# Patient Record
Sex: FEMALE | Race: WHITE | NOT HISPANIC OR LATINO | Employment: FULL TIME | URBAN - METROPOLITAN AREA
[De-identification: names, ages, dates, MRNs, and addresses within clinical notes are randomized per-mention and may not be internally consistent; named-entity substitution may affect disease eponyms.]

---

## 2017-06-23 ENCOUNTER — RADIANT APPOINTMENT (OUTPATIENT)
Dept: MAMMOGRAPHY | Facility: CLINIC | Age: 57
End: 2017-06-23
Payer: COMMERCIAL

## 2017-06-23 DIAGNOSIS — Z12.31 VISIT FOR SCREENING MAMMOGRAM: ICD-10-CM

## 2017-06-23 PROCEDURE — G0202 SCR MAMMO BI INCL CAD: HCPCS | Mod: TC

## 2017-07-03 ENCOUNTER — OFFICE VISIT (OUTPATIENT)
Dept: FAMILY MEDICINE | Facility: CLINIC | Age: 57
End: 2017-07-03
Payer: COMMERCIAL

## 2017-07-03 VITALS
HEIGHT: 62 IN | DIASTOLIC BLOOD PRESSURE: 82 MMHG | TEMPERATURE: 98.2 F | BODY MASS INDEX: 39.01 KG/M2 | HEART RATE: 88 BPM | RESPIRATION RATE: 15 BRPM | SYSTOLIC BLOOD PRESSURE: 118 MMHG | WEIGHT: 212 LBS | OXYGEN SATURATION: 95 %

## 2017-07-03 DIAGNOSIS — E66.9 NON MORBID OBESITY, UNSPECIFIED OBESITY TYPE: ICD-10-CM

## 2017-07-03 DIAGNOSIS — Z00.00 ROUTINE GENERAL MEDICAL EXAMINATION AT A HEALTH CARE FACILITY: Primary | ICD-10-CM

## 2017-07-03 DIAGNOSIS — Z11.59 ENCOUNTER FOR HEPATITIS C SCREENING TEST FOR LOW RISK PATIENT: ICD-10-CM

## 2017-07-03 DIAGNOSIS — D12.4 BENIGN NEOPLASM OF DESCENDING COLON: ICD-10-CM

## 2017-07-03 LAB
CHOLEST SERPL-MCNC: 257 MG/DL
GLUCOSE SERPL-MCNC: 87 MG/DL (ref 70–99)
HDLC SERPL-MCNC: 52 MG/DL
LDLC SERPL CALC-MCNC: 169 MG/DL
NONHDLC SERPL-MCNC: 205 MG/DL
TRIGL SERPL-MCNC: 180 MG/DL

## 2017-07-03 PROCEDURE — 36415 COLL VENOUS BLD VENIPUNCTURE: CPT | Performed by: INTERNAL MEDICINE

## 2017-07-03 PROCEDURE — 80061 LIPID PANEL: CPT | Performed by: INTERNAL MEDICINE

## 2017-07-03 PROCEDURE — 87624 HPV HI-RISK TYP POOLED RSLT: CPT | Performed by: INTERNAL MEDICINE

## 2017-07-03 PROCEDURE — 99386 PREV VISIT NEW AGE 40-64: CPT | Performed by: INTERNAL MEDICINE

## 2017-07-03 PROCEDURE — 86803 HEPATITIS C AB TEST: CPT | Performed by: INTERNAL MEDICINE

## 2017-07-03 PROCEDURE — 82947 ASSAY GLUCOSE BLOOD QUANT: CPT | Performed by: INTERNAL MEDICINE

## 2017-07-03 PROCEDURE — G0145 SCR C/V CYTO,THINLAYER,RESCR: HCPCS | Performed by: INTERNAL MEDICINE

## 2017-07-03 RX ORDER — CETIRIZINE HYDROCHLORIDE, PSEUDOEPHEDRINE HYDROCHLORIDE 5; 120 MG/1; MG/1
1 TABLET, FILM COATED, EXTENDED RELEASE ORAL 2 TIMES DAILY PRN
COMMUNITY

## 2017-07-03 RX ORDER — ASCORBIC ACID 1000 MG
1 TABLET ORAL DAILY
COMMUNITY
End: 2020-11-12

## 2017-07-03 RX ORDER — VITAMIN E 200 UNIT
1 CAPSULE ORAL DAILY
COMMUNITY
End: 2020-11-12

## 2017-07-03 NOTE — NURSING NOTE
"No chief complaint on file.      Initial /82  Pulse 88  Temp 98.2  F (36.8  C) (Oral)  Resp 15  Ht 5' 1.5\" (1.562 m)  Wt 212 lb (96.2 kg)  SpO2 95%  BMI 39.41 kg/m2 Estimated body mass index is 39.41 kg/(m^2) as calculated from the following:    Height as of this encounter: 5' 1.5\" (1.562 m).    Weight as of this encounter: 212 lb (96.2 kg).  Medication Reconciliation: complete    "

## 2017-07-03 NOTE — LETTER
July 5, 2017      Ketty Watkins  [address] [place] [address]        Dear MsEli Watkins,    Slightly elevated cholesterol; but overal cardiac risk assessment is low.   If risk greater than 7.5%, then statin therapy should be considered.    The 10-year ASCVD risk score ([place] Tanneryrosendo TONG Jr, et al., 2013) is: 2.2%    Values used to calculate the score:      Age: 56 years      Sex: Female      Is Non- : No      Diabetic: No      Tobacco smoker: No      Systolic Blood Pressure: 118 mmHg      Is BP treated: No      HDL Cholesterol: 52 mg/dL      Total Cholesterol: 257 mg/dL    Sincerely,     Luli Griffin MD

## 2017-07-03 NOTE — LETTER
July 19, 2017    Ketty Watkins  [address] [place] [address]    Dear Ketty,  We are happy to inform you that your PAP smear result from 07/03/17 is normal.  We are now able to do a follow up test on PAP smears. The DNA test is for HPV (Human Papilloma Virus). Cervical cancer is closely linked with certain types of HPV. Your result showed no evidence of high risk HPV.  Therefore we recommend you return in 3 years for your next pap smear.  You will still need to return to the clinic every year for an annual exam and other preventive tests.  Please contact the clinic at [phone] with any questions.  Sincerely,    Luli Griffin MD/[facility]

## 2017-07-03 NOTE — MR AVS SNAPSHOT
After Visit Summary   7/3/2017    Ketty Watkins    MRN: [id number]           Patient Information     Date Of Birth          1960        Visit Information        Provider Department      7/3/2017 8:00 AM Luli Griffin MD [facility]        Today's Diagnoses     Routine general medical examination at a health care facility    -  1    Benign neoplasm of descending colon        Encounter for hepatitis C screening test for low risk patient          Care Instructions      Preventive Health Recommendations  Female Ages 50 - 64    Yearly exam: See your health care provider every year in order to  o Review health changes.   o Discuss preventive care.    o Review your medicines if your doctor has prescribed any.      Get a Pap test every three years (unless you have an abnormal result and your provider advises testing more often).    If you get Pap tests with HPV test, you only need to test every 5 years, unless you have an abnormal result.     You do not need a Pap test if your uterus was removed (hysterectomy) and you have not had cancer.    You should be tested each year for STDs (sexually transmitted diseases) if you're at risk.     Have a mammogram every 1 to 2 years.    Have a colonoscopy at age 50, or have a yearly FIT test (stool test). These exams screen for colon cancer.      Have a cholesterol test every 5 years, or more often if advised.    Have a diabetes test (fasting glucose) every three years. If you are at risk for diabetes, you should have this test more often.     If you are at risk for osteoporosis (brittle bone disease), think about having a bone density scan (DEXA).    Shots: Get a flu shot each year. Get a tetanus shot every 10 years.    Nutrition:     Eat at least 5 servings of fruits and vegetables each day.    Eat whole-grain bread, whole-wheat pasta and brown rice instead of white grains and rice.    Talk to your provider about Calcium and Vitamin D.      Lifestyle    Exercise at least 150 minutes a week (30 minutes a day, 5 days a week). This will help you control your weight and prevent disease.    Limit alcohol to one drink per day.    No smoking.     Wear sunscreen to prevent skin cancer.     See your dentist every six months for an exam and cleaning.    See your eye doctor every 1 to 2 years.            Follow-ups after your visit        Additional Services     GASTROENTEROLOGY ADULT REF PROCEDURE ONLY       Last Lab Result: Creatinine (mg/dL)       Date                     Value                 09/24/2011               0.61             ----------  Body mass index is 39.41 kg/(m^2).      Patient will be contacted to schedule procedure.     Please be aware that coverage of these services is subject to the terms and limitations of your health insurance plan.  Call member services at your health plan with any benefit or coverage questions.  Any procedures must be performed at a [place] facility OR coordinated by your clinic's referral office.    Please bring the following with you to your appointment:    (1) Any X-Rays, CTs or MRIs which have been performed.  Contact the facility where they were done to arrange for  prior to your scheduled appointment.    (2) List of current medications   (3) This referral request   (4) Any documents/labs given to you for this referral                  Who to contact     If you have questions or need follow up information about today's clinic visit or your schedule please contact [facility] directly at [phone].  Normal or non-critical lab and imaging results will be communicated to you by MyChart, letter or phone within 4 business days after the clinic has received the results. If you do not hear from us within 7 days, please contact the clinic through MyChart or phone. If you have a critical or abnormal lab result, we will notify you by phone as soon as possible.  Submit refill requests through  "[facility] or call your pharmacy and they will forward the refill request to us. Please allow 3 business days for your refill to be completed.          Additional Information About Your Visit        MyChart Information     [employer] Gl[employer]ator[employer] lets you send messages to your doctor, view your test results, renew your prescriptions, schedule appointments and more. To sign up, go to [email or web address] . Click on \"Log in\" on the left side of the screen, which will take you to the Welcome page. Then click on \"Sign up Now\" on the right side of the page.     You will be asked to enter the access code listed below, as well as some personal information. Please follow the directions to create your username and password.     Your access code is: [email or web address]  Expires: 10/1/2017  8:47 AM     Your access code will  in 90 days. If you need help or a new code, please call your [facility] clinic or [phone].        Care EveryWhere ID     This is your Care EveryWhere ID. This could be used by other organizations to access your [facility] medical records  [id number]        Your Vitals Were     Pulse Temperature Respirations Height Pulse Oximetry BMI (Body Mass Index)    88 98.2  F (36.8  C) (Oral) 15 5' 1.5\" (1.562 m) 95% 39.41 kg/m2       Blood Pressure from Last 3 Encounters:   17 118/82   14 (!) 146/95    Weight from Last 3 Encounters:   17 212 lb (96.2 kg)   14 193 lb (87.5 kg)              We Performed the Following     GASTROENTEROLOGY ADULT REF PROCEDURE ONLY     GLUCOSE     Hepatitis C Screen Reflex to HCV RNA Quant and Genotype     HPV High Risk Types DNA Cervical     LIPID REFLEX TO DIRECT LDL PANEL     Pap imaged thin layer screen with HPV - recommended age 30 - 65 years (select HPV order below)          Today's Medication Changes          These changes are accurate as of: 7/3/17  8:47 AM.  If you have any questions, ask your nurse or doctor.               These medicines have changed or have updated " prescriptions.        Dose/Directions    FISH OIL OMEGA-3 PO   This may have changed:  Another medication with the same name was removed. Continue taking this medication, and follow the directions you see here.   Changed by:  Luli Griffin MD        Dose:  1 capsule   Take 1 capsule by mouth daily   Refills:  0         Stop taking these medicines if you haven't already. Please contact your care team if you have questions.     cetirizine 10 MG tablet   Commonly known as:  zyrTEC   Stopped by:  Luli Griffin MD                    Primary Care Provider Office Phone # Fax #    Roberth OHARA Pallas, [phone]       [facility] [address] [facility] [id number]        Equal Access to Services     : Hadii sudheer pollard hadasho Soomaali, waaxda luqadaha, qaybta kaalmada adeegyada, waxmatthew dos santos hayaparna castillo . So [facility] [phone].    ATENCIÓN: Si habla español, tiene a rivero disposición servicios gratuitos de asistencia lingüística. [facility] [phone].    We comply with applicable federal civil rights laws and [place] laws. We do not discriminate on the basis of race, color, national origin, age, disability sex, sexual orientation or gender identity.            Thank you!     Thank you for choosing [facility]  for your care. Our goal is always to provide you with excellent care. Hearing back from our patients is one way we can continue to improve our services. Please take a few minutes to complete the written survey that you may receive in the mail after your visit with us. Thank you!             Your Updated Medication List - Protect others around you: Learn how to safely use, store and throw away your medicines at www.disposemymeds.org.          This list is accurate as of: 7/3/17  8:47 AM.  Always use your most recent med list.                   Brand Name Dispense Instructions for use Diagnosis    cetirizine-psuedoePHEDrine  5-120 MG per 12 hr tablet    zyrTEC-D     Take 1 tablet by mouth 2 times daily as needed for allergies        FISH OIL OMEGA-3 PO      Take 1 capsule by mouth daily        Ginkgo Biloba 40 MG Tabs      Take 1 tablet by mouth daily        MEGARED OMEGA-3 KRILL  MG Caps      Take 1 capsule by mouth daily        Multi-vitamin Tabs tablet      Take 1 tablet by mouth daily        TURMERIC PO      Take 1 tablet by mouth daily

## 2017-07-03 NOTE — PROGRESS NOTES
SUBJECTIVE:   CC: Ketty Watkins is an 56 year old woman who presents for preventive health visit.     Healthy Habits:  Answers for HPI/ROS submitted by the patient on 7/3/2017   Annual Exam:  Getting at least 3 servings of Calcium per day:: Yes  Bi-annual eye exam:: NO  Dental care twice a year:: Yes  Sleep apnea or symptoms of sleep apnea:: Sleep apnea  Diet:: Regular (no restrictions)  Frequency of exercise:: 2-3 days/week  Taking medications regularly:: Yes  Medication side effects:: None  Additional concerns today:: No  PHQ-2 Score: 0  Duration of exercise:: 15-30 minutes    Right Foot Pain  Pt presents with pain on the bottom of her right foot.  She notices it when it is hot outside or humid, and her ankles swell up.  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  She tries to get up and move around every once in a while but often notices herself sitting for hours at a time.     Today's PHQ-2 Score:   PHQ-2 ( 1999 Pfizer) 7/3/2017   Q1: Little interest or pleasure in doing things 0   Q2: Feeling down, depressed or hopeless 0   PHQ-2 Score 0   Q1: Little interest or pleasure in doing things Not at all   Q2: Feeling down, depressed or hopeless Not at all   PHQ-2 Score 0       Abuse: Current or Past(Physical, Sexual or Emotional)- No  Do you feel safe in your environment - Yes    Social History   Substance Use Topics     Smoking status: Never Smoker     Smokeless tobacco: Never Used     Alcohol use Yes      Comment: rarely     The patient does not drink >3 drinks per day nor >7 drinks per week.    Reviewed orders with patient.  Reviewed health maintenance and updated orders accordingly - Yes  Labs reviewed in [facility]    Patient over age 50, mutual decision to screen reflected in health maintenance.    Pertinent mammograms are reviewed under the imaging tab.  History of abnormal Pap smear: NO - age 30- 65 PAP every 3 years recommended  Last 3 Pap Results: No results found for: PAP    Reviewed and updated  "as needed this visit by clinical staff  Tobacco  Allergies  Meds  Problems  Med Hx  Surg Hx  Fam Hx  Soc Hx        Reviewed and updated as needed this visit by Provider  Problems        REVIEW OF SYSTEMS:  C: NEGATIVE for fever, chills, or change in weight  E/M: NEGATIVE for ear, nose, mouth, or throat problems  R: NEGATIVE for cough or shortness of breath  CV: NEGATIVE for chest pain, palpitations or peripheral edema  GI: Positive for some acid reflux occaisionally when forward bending with exercise; colon polyp 12 mm noted on screenign colonosocpy done 11/2014- Dr. Fisher. NEGATIVE for nausea, abdominal pain, or change in bowel habits  : no urinary symptoms  GYN: 2 adult children; menopausal since 2013; no hot flashes.  HEME/Lymph: no hx of anemia; seasonal allergies  Endocrine: no hx of gestational diabetes.  M: Positive for right foot pain; NEGATIVE for significant arthralgias or myalgia  P: NEGATIVE for changes in mood or affect    This document serves as a record of the services and decisions personally performed and made by Luli Griffin MD. It was created on her behalf by Donny Lo, a trained medical scribe. The creation of this document is based the provider's statements to the medical scribe.  Donny Lo, July 3, 2017 8:20    OBJECTIVE:   /82  Pulse 88  Temp 98.2  F (36.8  C) (Oral)  Resp 15  Ht 1.562 m (5' 1.5\")  Wt 96.2 kg (212 lb)  SpO2 95%  BMI 39.41 kg/m2    EXAM:  GENERAL: Patient appears healthy, alert and not distressed.  HENT: Ear canals and TM's appear normal and mouth without ulcers or lesions, oropharynx is clear and oral mucous membranes are moist  NECK: No adenopathy present, no asymmetry, masses, or scars noted, thyroid is normal to palpation  RESP: Lungs are clear to auscultation - no rales, rhonchi or wheezes present  CV: Regular rate and rhythm, normal S1 S2 heart sounds, no ectopy, no peripheral edema present, peripheral pulses normal, no carotid " bruit.  ABDOMEN: Soft, nontender, no hepatosplenomegaly, no masses, normal bowel sounds  BREAST: Normal without masses, tenderness or nipple discharge and no palpable axillary masses or adenopathy   (female): PAP obtained today; Normal female external genitalia, vaginal mucosal atrophy noted and normal cervix, adnexae, and uterus without masses or abnormal discharge  MS: No gross musculoskeletal defects noted, no edema, gait is age appropriate without ataxia, normal heel/toe walk, negative straight leg raise  NEURO: Patient exhibits normal strength and tone, normal speech and mentation, DTRs intact bilaterally, negative Romberg's test  PSYCH: Mentation appears normal, affect is normal/bright    Diagnostic Test Results:  No results found for this or any previous visit (from the past 24 hour(s)).     ASSESSMENT/PLAN:   (Z00.00) Routine general medical examination at a health care facility  (primary encounter diagnosis)  Comment: Pt was due for an annual physical.  Due for PAP and was establishing care.  Plan: GLUCOSE, LIPID REFLEX TO DIRECT LDL PANEL, Pap         imaged thin layer screen with HPV - recommended        age 30 - 65 years (select HPV order below), HPV        High Risk Types DNA Cervical          (D12.4) Benign neoplasm of descending colon  Comment: 2014 12 mm polyp resected at hepatic flexure. follow up in 3 years.  Plan: GASTROENTEROLOGY ADULT REF PROCEDURE ONLY, to be completed this year          (Z11.59) Encounter for hepatitis C screening test for low risk patient  Comment: The patient was born between 1943 and 1963, placing them at an increased risk for developing Hepatitis C. They have been informed of their increased risk, and will therefore follow through with screening today.   Plan: Hepatitis C Screen Reflex to HCV RNA Quant and         Genotype            COUNSELING:   Reviewed preventive health counseling, as reflected in patient instructions  Special attention given to:        Regular  "exercise       Healthy diet/nutrition     reports that she has never smoked. She has never used smokeless tobacco.    Estimated body mass index is 39.41 kg/(m^2) as calculated from the following:    Height as of this encounter: 1.562 m (5' 1.5\").    Weight as of this encounter: 96.2 kg (212 lb).   Weight management plan: Encouraged to exercise more frequently with a goal of 150 minutes of exercise per week, and to maintain a healthy, well-balanced diet.    Counseling Resources:  ATP IV Guidelines  Pooled Cohorts Equation Calculator  Breast Cancer Risk Calculator  FRAX Risk Assessment  ICSI Preventive Guidelines  Dietary Guidelines for Americans, 2010  USDA's MyPlate  ASA Prophylaxis  Lung CA Screening    The information in this document, created by a medical scribe for me, accurately reflects the services I personally performed and the decisions made by me. I have reviewed and approved this document for accuracy.  Dr. Luli Griffin, July 3, 2017, 8:53 AM    Luli Griffin MD  Internal Medicine  [facility]  "

## 2017-07-05 LAB — HCV AB SERPL QL IA: NORMAL

## 2017-07-07 LAB
COPATH REPORT: NORMAL
PAP: NORMAL

## 2017-07-10 LAB
FINAL DIAGNOSIS: NORMAL
HPV HR 12 DNA CVX QL NAA+PROBE: NEGATIVE
HPV16 DNA SPEC QL NAA+PROBE: NEGATIVE
HPV18 DNA SPEC QL NAA+PROBE: NEGATIVE
SPECIMEN DESCRIPTION: NORMAL

## 2017-10-19 ENCOUNTER — TRANSFERRED RECORDS (OUTPATIENT)
Dept: HEALTH INFORMATION MANAGEMENT | Facility: CLINIC | Age: 57
End: 2017-10-19

## 2017-11-28 ENCOUNTER — HOSPITAL ENCOUNTER (OUTPATIENT)
Facility: CLINIC | Age: 57
Discharge: HOME | End: 2017-11-28
Attending: COLON & RECTAL SURGERY | Admitting: COLON & RECTAL SURGERY
Payer: COMMERCIAL

## 2017-11-28 VITALS
RESPIRATION RATE: 14 BRPM | BODY MASS INDEX: 39.01 KG/M2 | OXYGEN SATURATION: 97 % | DIASTOLIC BLOOD PRESSURE: 85 MMHG | HEART RATE: 85 BPM | WEIGHT: 212 LBS | HEIGHT: 62 IN | SYSTOLIC BLOOD PRESSURE: 136 MMHG

## 2017-11-28 LAB — COLONOSCOPY: NORMAL

## 2017-11-28 PROCEDURE — 99153 MOD SED SAME PHYS/QHP EA: CPT | Performed by: COLON & RECTAL SURGERY

## 2017-11-28 PROCEDURE — 45378 DIAGNOSTIC COLONOSCOPY: CPT | Performed by: COLON & RECTAL SURGERY

## 2017-11-28 PROCEDURE — 25000128 H RX IP 250 OP 636: Performed by: COLON & RECTAL SURGERY

## 2017-11-28 PROCEDURE — G0500 MOD SEDAT ENDO SERVICE >5YRS: HCPCS | Performed by: COLON & RECTAL SURGERY

## 2017-11-28 PROCEDURE — G0121 COLON CA SCRN NOT HI RSK IND: HCPCS | Performed by: COLON & RECTAL SURGERY

## 2017-11-28 RX ORDER — LIDOCAINE 40 MG/G
CREAM TOPICAL
Status: DISCONTINUED | OUTPATIENT
Start: 2017-11-28 | End: 2017-11-28 | Stop reason: HOSPADM

## 2017-11-28 RX ORDER — FLUMAZENIL 0.1 MG/ML
0.2 INJECTION, SOLUTION INTRAVENOUS
Status: DISCONTINUED | OUTPATIENT
Start: 2017-11-28 | End: 2017-11-28 | Stop reason: HOSPADM

## 2017-11-28 RX ORDER — NALOXONE HYDROCHLORIDE 0.4 MG/ML
.1-.4 INJECTION, SOLUTION INTRAMUSCULAR; INTRAVENOUS; SUBCUTANEOUS
Status: DISCONTINUED | OUTPATIENT
Start: 2017-11-28 | End: 2017-11-28 | Stop reason: HOSPADM

## 2017-11-28 RX ORDER — ONDANSETRON 2 MG/ML
4 INJECTION INTRAMUSCULAR; INTRAVENOUS EVERY 6 HOURS PRN
Status: DISCONTINUED | OUTPATIENT
Start: 2017-11-28 | End: 2017-11-28 | Stop reason: HOSPADM

## 2017-11-28 RX ORDER — FENTANYL CITRATE 50 UG/ML
INJECTION, SOLUTION INTRAMUSCULAR; INTRAVENOUS PRN
Status: DISCONTINUED | OUTPATIENT
Start: 2017-11-28 | End: 2017-11-28 | Stop reason: HOSPADM

## 2017-11-28 RX ORDER — ONDANSETRON 4 MG/1
4 TABLET, ORALLY DISINTEGRATING ORAL EVERY 6 HOURS PRN
Status: DISCONTINUED | OUTPATIENT
Start: 2017-11-28 | End: 2017-11-28 | Stop reason: HOSPADM

## 2017-11-28 RX ORDER — ONDANSETRON 2 MG/ML
4 INJECTION INTRAMUSCULAR; INTRAVENOUS
Status: DISCONTINUED | OUTPATIENT
Start: 2017-11-28 | End: 2017-11-28 | Stop reason: HOSPADM

## 2017-11-28 RX ORDER — ONDANSETRON 2 MG/ML
INJECTION INTRAMUSCULAR; INTRAVENOUS PRN
Status: DISCONTINUED | OUTPATIENT
Start: 2017-11-28 | End: 2017-11-28 | Stop reason: HOSPADM

## 2017-11-28 NOTE — IP AVS SNAPSHOT
[facility]    [address]    [facility] [address]    Phone:  [phone]    Fax:  [phone]                                       After Visit Summary   11/28/2017    Ketty Watkins    MRN: [id number]           After Visit Summary Signature Page     I have received my discharge instructions, and my questions have been answered. I have discussed any challenges I see with this plan with the nurse or doctor.    ..........................................................................................................................................  Patient/Patient Representative Signature      ..........................................................................................................................................  Patient Representative Print Name and Relationship to Patient    ..................................................               ................................................  Date                                            Time    ..........................................................................................................................................  Reviewed by Signature/Title    ...................................................              ..............................................  Date                                                            Time

## 2017-11-28 NOTE — DISCHARGE INSTRUCTIONS
Eating a High-Fiber Diet  Fiber is what gives strength and structure to plants. Most grains, beans, vegetables, and fruits contain fiber. Foods rich in fiber are often low in calories and fat, and they fill you up more. They may also reduce your risks for certain health problems. To find out the amount of fiber in canned, packaged, or frozen foods, read the  Nutrition Facts  label. It tells you how much fiber is in a serving.      Types of Fiber and Their Benefits  There are two types of fiber: insoluble and soluble. They both aid digestion and help you maintain a healthy weight.  Insoluble fiber: This is found in whole grains, cereals, certain fruits and vegetables (such as apple skin, corn, and carrots). Insoluble fiber may prevent constipation and reduce the risk of certain types of cancer.   Soluble fiber: This type of fiber is in oats, beans, and certain fruits and vegetables (such as strawberries and peas). Soluble fiber can reduce cholesterol (which may help lower the risk of heart disease), and helps control blood sugar levels.  Look for High-Fiber Foods  Whole-grain breads and cereals: Try to eat 6-8 ounces a day. Include wheat and oat bran cereals, whole-wheat muffins or toast, and corn tortillas in your meals.  Fruits: Try to eat 2 cups a day. Apples, oranges, strawberries, pears, and bananas are good sources. (Note: Fruit juice is low in fiber.)  Vegetables: Try to eat 3 cups a day. Add asparagus, carrots, broccoli, peas, and corn to your meals.  Legumes (beans): One cup of cooked lentils gives you over 15 grams of fiber. Try navy beans, lentils, and chickpeas.  Seeds:  A small handful of seeds gives you about 3 grams of fiber. Try sunflower seeds.    Keep Track of Your Fiber  A healthy diet includes 31 grams of fiber a day if you have a 2,000-calorie diet. Keep track of how much fiber you eat. Start by reading food labels. Then eat a variety of foods high in fiber. Ask your doctor about supplemental  fiber products.            6002-6251 [facility], [address] [place] [address], [place] [address]. All rights reserved. This information is not intended as a substitute for professional medical care. Always follow your healthcare professional's instructions.    Understanding Diverticulosis and Diverticulitis     Pouches or diverticula usually occur in the lower part of the colon called the sigmoid.      Diverticulitis occurs when the pouches become inflamed.     The colon (large intestine) is the last part of the digestive tract. It absorbs water from stool and changes it from a liquid to a solid. In certain cases, small pouches called diverticula can form in the colon wall. This condition is called diverticulosis. The pouches can become infected. If this happens, it becomes a more serious problem called diverticulitis. These problems can be painful. But they can be managed.   Managing Your Condition  Diet changes or taking medications are often tried first. These may be enough to bring relief. If the case is bad, surgery may be done. You and your doctor can discuss the plan that is best for you.  If You Have Diverticulosis  Diet changes are often enough to control symptoms. The main changes are adding fiber (roughage) and drinking more water. Fiber absorbs water as it travels through your colon. This helps your stool stay soft and move smoothly. Water helps this process. If needed, you may be told to take over-the-counter stool softeners. To help relieve pain, antispasmodic medications may be prescribed.  If You Have Diverticulitis  Treatment depends on how bad your symptoms are.  For mild symptoms: You may be put on a liquid diet for a short time. You may also be prescribed antibiotics. If these two steps relieve your symptoms, you may then be prescribed a high-fiber diet. If you still have symptoms, your doctor will discuss further treatment options with you.  For severe symptoms: You may need to be admitted to the  hospital. There, you can be given IV antibiotics and fluids. Once symptoms are under control, the above treatments may be tried. If these don t control your condition, your doctor may discuss the option of having surgery with you.  [place] to Colon Health  Help keep your colon healthy with a diet that includes plenty of high-fiber fruits, vegetables, and whole grains. Drink plenty of liquids like water and juice. Your doctor may also recommend avoiding seeds and nuts.          3057-0035 Georgina \[facility] [place] [facility]\"", [address] [place] [address], [place] [address]. All rights reserved. This information is not intended as a substitute for professional medical care. Always follow your healthcare professional's instructions.

## 2017-11-28 NOTE — OP NOTE
.See Provation Note In Chart    Michelle Fisher MD  [facility]  Office Phone # [phone]

## 2017-11-28 NOTE — H&P
Pre-Endoscopy History and Physical     Ketty Watkins MRN# [id number]   YOB: 1960 Age: 57 year old     Date of Procedure: 11/28/2017  Primary care provider: Luli Griffin  Type of Endoscopy: colonoscopy  Reason for Procedure: surveillance  Type of Anesthesia Anticipated: Moderate Sedation    HPI:    Ketty is a 57 year old female who will be undergoing the above procedure.      A history and physical has been performed. The patient's medications and allergies have been reviewed. The risks and benefits of the procedure and the sedation options and risks were discussed with the patient.  All questions were answered and informed consent was obtained.      She denies a personal or family history of anesthesia complications or bleeding disorders.     Allergies   Allergen Reactions     Codeine Itching        Prior to Admission Medications   Prescriptions Last Dose Informant Patient Reported? Taking?   Ginkgo Biloba 40 MG TABS Past Week at Unknown time  Yes Yes   Sig: Take 1 tablet by mouth daily   MEGARED OMEGA-3 KRILL  MG CAPS Past Week at Unknown time  Yes Yes   Sig: Take 1 capsule by mouth daily   Omega-3 Fatty Acids (FISH OIL OMEGA-3 PO) Past Week at Unknown time  Yes Yes   Sig: Take 1 capsule by mouth daily   TURMERIC PO Past Week at Unknown time  Yes Yes   Sig: Take 1 tablet by mouth daily   cetirizine-psuedoePHEDrine (ZYRTEC-D) 5-120 MG per 12 hr tablet Past Week at Unknown time  Yes Yes   Sig: Take 1 tablet by mouth 2 times daily as needed for allergies   multivitamin, therapeutic with minerals (MULTI-VITAMIN) TABS Past Week at Unknown time  Yes Yes   Sig: Take 1 tablet by mouth daily      Facility-Administered Medications: None       Patient Active Problem List   Diagnosis     Benign neoplasm of descending colon     Non morbid obesity, unspecified obesity type        Past Medical History:   Diagnosis Date     Colon polyps         Past Surgical History:   Procedure Laterality Date      "COLONOSCOPY         Social History   Substance Use Topics     Smoking status: Never Smoker     Smokeless tobacco: Never Used     Alcohol use Yes      Comment: rarely       Family History   Problem Relation Age of Onset     Coronary Artery Disease Mother      Hyperlipidemia Mother      Coronary Artery Disease Father      Hyperlipidemia Father      Cancer - colorectal No family hx of        REVIEW OF SYSTEMS:     5 point ROS negative except as noted above in HPI, including Gen., Resp., CV, GI &  system review.      PHYSICAL EXAM:   BP (!) 164/108  Pulse 85  Resp 16  Ht 1.568 m (5' 1.75\")  Wt 96.2 kg (212 lb)  SpO2 98%  BMI 39.09 kg/m2 Estimated body mass index is 39.09 kg/(m^2) as calculated from the following:    Height as of this encounter: 1.568 m (5' 1.75\").    Weight as of this encounter: 96.2 kg (212 lb).   GENERAL APPEARANCE: healthy and alert  MENTAL STATUS: alert  AIRWAY EXAM: Mallampatti Class II (visualization of the soft palate, fauces, and uvula)  RESP: lungs clear to auscultation - no rales, rhonchi or wheezes  CV: regular rates and rhythm      DIAGNOSTICS:    Not indicated      IMPRESSION   ASA Class 2 - Mild systemic disease        PLAN:       Plan for colonoscopy. We discussed the risks, benefits and alternatives and the patient wished to proceed.    The above has been forwarded to the consulting provider.      Signed Electronically by: Michelle Fisher MD  November 28, 2017    "

## 2017-11-28 NOTE — OR NURSING
Pt discharged home pt said she feels slight nausea when start moving ,pt encouraged to stay longer in recovery pt refused and almanza id I want to go home and rest ,both pt and  said pt wants to leave

## 2017-11-28 NOTE — IP AVS SNAPSHOT
MRN:[id number]                      After Visit Summary   11/28/2017    Ketty Watkins    MRN: [id number]           Thank you!     Thank you for choosing [facility] for your care. Our goal is always to provide you with excellent care. Hearing back from our patients is one way we can continue to improve our services. Please take a few minutes to complete the written survey that you may receive in the mail after you visit. If you would like to speak to someone directly about your visit please contact Patient Relations at [phone]. Thank you!          Patient Information     Date Of Birth          1960        About your hospital stay     You were admitted on:  November 28, 2017 You last received care in the:  [facility]    You were discharged on:  November 28, 2017       Who to Call     For medical emergencies, please call 911.  For non-urgent questions about your medical care, please call your primary care provider or clinic, [phone]  For questions related to your surgery, please call your surgery clinic        Attending Provider     Provider Specialty    Michelle Fisher MD Colon and Rectal Surgery       Primary Care Provider Office Phone # Fax #    Luli Griffin [phone]      Further instructions from your care team         Eating a High-Fiber Diet  Fiber is what gives strength and structure to plants. Most grains, beans, vegetables, and fruits contain fiber. Foods rich in fiber are often low in calories and fat, and they fill you up more. They may also reduce your risks for certain health problems. To find out the amount of fiber in canned, packaged, or frozen foods, read the  Nutrition Facts  label. It tells you how much fiber is in a serving.      Types of Fiber and Their Benefits  There are two types of fiber: insoluble and soluble. They both aid digestion and help you maintain a healthy weight.  Insoluble fiber: This is  found in whole grains, cereals, certain fruits and vegetables (such as apple skin, corn, and carrots). Insoluble fiber may prevent constipation and reduce the risk of certain types of cancer.   Soluble fiber: This type of fiber is in oats, beans, and certain fruits and vegetables (such as strawberries and peas). Soluble fiber can reduce cholesterol (which may help lower the risk of heart disease), and helps control blood sugar levels.  Look for High-Fiber Foods  Whole-grain breads and cereals: Try to eat 6-8 ounces a day. Include wheat and oat bran cereals, whole-wheat muffins or toast, and corn tortillas in your meals.  Fruits: Try to eat 2 cups a day. Apples, oranges, strawberries, pears, and bananas are good sources. (Note: Fruit juice is low in fiber.)  Vegetables: Try to eat 3 cups a day. Add asparagus, carrots, broccoli, peas, and corn to your meals.  Legumes (beans): One cup of cooked lentils gives you over 15 grams of fiber. Try navy beans, lentils, and chickpeas.  Seeds:  A small handful of seeds gives you about 3 grams of fiber. Try sunflower seeds.    Keep Track of Your Fiber  A healthy diet includes 31 grams of fiber a day if you have a 2,000-calorie diet. Keep track of how much fiber you eat. Start by reading food labels. Then eat a variety of foods high in fiber. Ask your doctor about supplemental fiber products.            7606-2531 [employer] [facility], [address] [place] [address], [place] [address]. All rights reserved. This information is not intended as a substitute for professional medical care. Always follow your healthcare professional's instructions.    Understanding Diverticulosis and Diverticulitis     Pouches or diverticula usually occur in the lower part of the colon called the sigmoid.      Diverticulitis occurs when the pouches become inflamed.     The colon (large intestine) is the last part of the digestive tract. It absorbs water from stool and changes it from a liquid to a solid. In certain  cases, small pouches called diverticula can form in the colon wall. This condition is called diverticulosis. The pouches can become infected. If this happens, it becomes a more serious problem called diverticulitis. These problems can be painful. But they can be managed.   Managing Your Condition  Diet changes or taking medications are often tried first. These may be enough to bring relief. If the case is bad, surgery may be done. You and your doctor can discuss the plan that is best for you.  If You Have Diverticulosis  Diet changes are often enough to control symptoms. The main changes are adding fiber (roughage) and drinking more water. Fiber absorbs water as it travels through your colon. This helps your stool stay soft and move smoothly. Water helps this process. If needed, you may be told to take over-the-counter stool softeners. To help relieve pain, antispasmodic medications may be prescribed.  If You Have Diverticulitis  Treatment depends on how bad your symptoms are.  For mild symptoms: You may be put on a liquid diet for a short time. You may also be prescribed antibiotics. If these two steps relieve your symptoms, you may then be prescribed a high-fiber diet. If you still have symptoms, your doctor will discuss further treatment options with you.  For severe symptoms: You may need to be admitted to the hospital. There, you can be given IV antibiotics and fluids. Once symptoms are under control, the above treatments may be tried. If these don t control your condition, your doctor may discuss the option of having surgery with you.  [place] [facility] Colon [facility]  Help keep your colon healthy with a diet that includes plenty of high-fiber fruits, vegetables, and whole grains. Drink plenty of liquids like water and juice. Your doctor may also recommend avoiding seeds and nuts.          [id number] [facility], [address] [place] [address], [place] [address]. All rights reserved. This information is not intended as a  "substitute for professional medical care. Always follow your healthcare professional's instructions.    Pending Results     No orders found from 11/26/2017 to 11/29/2017.            Admission Information     Date & Time Provider Department Dept. Phone    11/28/2017 Michelle Fisher MD [facility] [phone]      Your Vitals Were     Blood Pressure Pulse Respirations Height Weight Pulse Oximetry    155/112 85 14 1.568 m (5' 1.75\") 96.2 kg (212 lb) 94%    BMI (Body Mass Index)                   39.09 kg/m2           MyChart Information     [employer] gives you secure access to your electronic health record. If you see a primary care provider, you can also send messages to your care team and make appointments. If you have questions, please call your primary care clinic.  If you do not have a primary care provider, please call [phone] and they will assist you.        Care EveryWhere ID     This is your Care EveryWhere ID. This could be used by other organizations to access your [facility] medical records  [id number]        Equal Access to Services     EDWIN MARTINEZ : Hadii sudheer pollard hadasho Soomaali, waaxda luqadaha, qaybta kaalmada adeegyakenn, john horan. So [facility] [phone].    ATENCIÓN: Si habla español, tiene a rviero disposición servicios gratuitos de asistencia lingüística. Llame al [phone].    We comply with applicable federal civil rights laws and [place] laws. We do not discriminate on the basis of race, color, national origin, age, disability, sex, sexual orientation, or gender identity.               Review of your medicines      CONTINUE these medicines which have NOT CHANGED        Dose / Directions    cetirizine-psuedoePHEDrine 5-120 MG per 12 hr tablet   Commonly known as:  zyrTEC-D        Dose:  1 tablet   Take 1 tablet by mouth 2 times daily as needed for allergies   Refills:  0       FISH OIL OMEGA-3 PO        Dose:  1 capsule   Take 1 capsule by " mouth daily   Refills:  0       Ginkgo Biloba 40 MG Tabs        Dose:  1 tablet   Take 1 tablet by mouth daily   Refills:  0       MEGARED OMEGA-3 KRILL  MG Caps        Dose:  1 capsule   Take 1 capsule by mouth daily   Refills:  0       Multi-vitamin Tabs tablet        Dose:  1 tablet   Take 1 tablet by mouth daily   Refills:  0       TURMERIC PO        Dose:  1 tablet   Take 1 tablet by mouth daily   Refills:  0                Protect others around you: Learn how to safely use, store and throw away your medicines at www.disposemymeds.org.             Medication List: This is a list of all your medications and when to take them. Check marks below indicate your daily home schedule. Keep this list as a reference.      Medications           Morning Afternoon Evening Bedtime As Needed    cetirizine-psuedoePHEDrine 5-120 MG per 12 hr tablet   Commonly known as:  zyrTEC-D   Take 1 tablet by mouth 2 times daily as needed for allergies                                FISH OIL OMEGA-3 PO   Take 1 capsule by mouth daily                                Ginkgo Biloba 40 MG Tabs   Take 1 tablet by mouth daily                                MEGARED OMEGA-3 KRILL  MG Caps   Take 1 capsule by mouth daily                                Multi-vitamin Tabs tablet   Take 1 tablet by mouth daily                                TURMERIC PO   Take 1 tablet by mouth daily

## 2019-06-16 ENCOUNTER — APPOINTMENT (OUTPATIENT)
Dept: ULTRASOUND IMAGING | Facility: CLINIC | Age: 59
End: 2019-06-16
Attending: EMERGENCY MEDICINE
Payer: COMMERCIAL

## 2019-06-16 ENCOUNTER — HOSPITAL ENCOUNTER (EMERGENCY)
Facility: CLINIC | Age: 59
Discharge: HOME | End: 2019-06-16
Attending: EMERGENCY MEDICINE | Admitting: EMERGENCY MEDICINE
Payer: COMMERCIAL

## 2019-06-16 VITALS
DIASTOLIC BLOOD PRESSURE: 78 MMHG | HEART RATE: 80 BPM | TEMPERATURE: 96.9 F | RESPIRATION RATE: 20 BRPM | SYSTOLIC BLOOD PRESSURE: 129 MMHG | OXYGEN SATURATION: 100 %

## 2019-06-16 DIAGNOSIS — K80.20 CALCULUS OF GALLBLADDER WITHOUT CHOLECYSTITIS WITHOUT OBSTRUCTION: ICD-10-CM

## 2019-06-16 DIAGNOSIS — R10.11 RIGHT UPPER QUADRANT ABDOMINAL PAIN: ICD-10-CM

## 2019-06-16 LAB
ALBUMIN SERPL-MCNC: 3.8 G/DL (ref 3.4–5)
ALBUMIN UR-MCNC: NEGATIVE MG/DL
ALP SERPL-CCNC: 137 U/L (ref 40–150)
ALT SERPL W P-5'-P-CCNC: 27 U/L (ref 0–50)
ANION GAP SERPL CALCULATED.3IONS-SCNC: 11 MMOL/L (ref 3–14)
APPEARANCE UR: ABNORMAL
AST SERPL W P-5'-P-CCNC: 23 U/L (ref 0–45)
BACTERIA #/AREA URNS HPF: ABNORMAL /HPF
BASOPHILS # BLD AUTO: 0 10E9/L (ref 0–0.2)
BASOPHILS NFR BLD AUTO: 0.3 %
BILIRUB SERPL-MCNC: 0.4 MG/DL (ref 0.2–1.3)
BILIRUB UR QL STRIP: NEGATIVE
BUN SERPL-MCNC: 9 MG/DL (ref 7–30)
CALCIUM SERPL-MCNC: 8.6 MG/DL (ref 8.5–10.1)
CHLORIDE SERPL-SCNC: 104 MMOL/L (ref 94–109)
CO2 SERPL-SCNC: 22 MMOL/L (ref 20–32)
COLOR UR AUTO: ABNORMAL
CREAT SERPL-MCNC: 0.49 MG/DL (ref 0.52–1.04)
DIFFERENTIAL METHOD BLD: ABNORMAL
EOSINOPHIL # BLD AUTO: 0 10E9/L (ref 0–0.7)
EOSINOPHIL NFR BLD AUTO: 0.4 %
ERYTHROCYTE [DISTWIDTH] IN BLOOD BY AUTOMATED COUNT: 12 % (ref 10–15)
GFR SERPL CREATININE-BSD FRML MDRD: >90 ML/MIN/{1.73_M2}
GLUCOSE SERPL-MCNC: 139 MG/DL (ref 70–99)
GLUCOSE UR STRIP-MCNC: NEGATIVE MG/DL
HCT VFR BLD AUTO: 48 % (ref 35–47)
HGB BLD-MCNC: 15.8 G/DL (ref 11.7–15.7)
HGB UR QL STRIP: NEGATIVE
IMM GRANULOCYTES # BLD: 0 10E9/L (ref 0–0.4)
IMM GRANULOCYTES NFR BLD: 0.4 %
KETONES UR STRIP-MCNC: ABNORMAL MG/DL
LEUKOCYTE ESTERASE UR QL STRIP: ABNORMAL
LIPASE SERPL-CCNC: 65 U/L (ref 73–393)
LYMPHOCYTES # BLD AUTO: 1.3 10E9/L (ref 0.8–5.3)
LYMPHOCYTES NFR BLD AUTO: 18.2 %
MCH RBC QN AUTO: 30 PG (ref 26.5–33)
MCHC RBC AUTO-ENTMCNC: 32.9 G/DL (ref 31.5–36.5)
MCV RBC AUTO: 91 FL (ref 78–100)
MONOCYTES # BLD AUTO: 0.2 10E9/L (ref 0–1.3)
MONOCYTES NFR BLD AUTO: 3.1 %
MUCOUS THREADS #/AREA URNS LPF: PRESENT /LPF
NEUTROPHILS # BLD AUTO: 5.5 10E9/L (ref 1.6–8.3)
NEUTROPHILS NFR BLD AUTO: 77.6 %
NITRATE UR QL: NEGATIVE
NRBC # BLD AUTO: 0 10*3/UL
NRBC BLD AUTO-RTO: 0 /100
PH UR STRIP: 7.5 PH (ref 5–7)
PLATELET # BLD AUTO: 275 10E9/L (ref 150–450)
POTASSIUM SERPL-SCNC: 3.5 MMOL/L (ref 3.4–5.3)
PROT SERPL-MCNC: 7.7 G/DL (ref 6.8–8.8)
RBC # BLD AUTO: 5.26 10E12/L (ref 3.8–5.2)
RBC #/AREA URNS AUTO: 2 /HPF (ref 0–2)
SODIUM SERPL-SCNC: 137 MMOL/L (ref 133–144)
SOURCE: ABNORMAL
SP GR UR STRIP: 1.01 (ref 1–1.03)
SQUAMOUS #/AREA URNS AUTO: 3 /HPF (ref 0–1)
TROPONIN I SERPL-MCNC: <0.015 UG/L (ref 0–0.04)
UROBILINOGEN UR STRIP-MCNC: NORMAL MG/DL (ref 0–2)
WBC # BLD AUTO: 7 10E9/L (ref 4–11)
WBC #/AREA URNS AUTO: 11 /HPF (ref 0–5)

## 2019-06-16 PROCEDURE — 80053 COMPREHEN METABOLIC PANEL: CPT | Performed by: EMERGENCY MEDICINE

## 2019-06-16 PROCEDURE — 76705 ECHO EXAM OF ABDOMEN: CPT

## 2019-06-16 PROCEDURE — 96375 TX/PRO/DX INJ NEW DRUG ADDON: CPT

## 2019-06-16 PROCEDURE — 96361 HYDRATE IV INFUSION ADD-ON: CPT

## 2019-06-16 PROCEDURE — 87086 URINE CULTURE/COLONY COUNT: CPT | Performed by: EMERGENCY MEDICINE

## 2019-06-16 PROCEDURE — 84484 ASSAY OF TROPONIN QUANT: CPT | Performed by: EMERGENCY MEDICINE

## 2019-06-16 PROCEDURE — 25000128 H RX IP 250 OP 636: Performed by: EMERGENCY MEDICINE

## 2019-06-16 PROCEDURE — 81001 URINALYSIS AUTO W/SCOPE: CPT | Performed by: EMERGENCY MEDICINE

## 2019-06-16 PROCEDURE — 99285 EMERGENCY DEPT VISIT HI MDM: CPT | Mod: 25

## 2019-06-16 PROCEDURE — 83690 ASSAY OF LIPASE: CPT | Performed by: EMERGENCY MEDICINE

## 2019-06-16 PROCEDURE — 85025 COMPLETE CBC W/AUTO DIFF WBC: CPT | Performed by: EMERGENCY MEDICINE

## 2019-06-16 PROCEDURE — 96374 THER/PROPH/DIAG INJ IV PUSH: CPT

## 2019-06-16 PROCEDURE — 96376 TX/PRO/DX INJ SAME DRUG ADON: CPT

## 2019-06-16 RX ORDER — SODIUM CHLORIDE 9 MG/ML
1000 INJECTION, SOLUTION INTRAVENOUS CONTINUOUS
Status: DISCONTINUED | OUTPATIENT
Start: 2019-06-16 | End: 2019-06-16 | Stop reason: HOSPADM

## 2019-06-16 RX ORDER — ONDANSETRON 2 MG/ML
4 INJECTION INTRAMUSCULAR; INTRAVENOUS EVERY 30 MIN PRN
Status: DISCONTINUED | OUTPATIENT
Start: 2019-06-16 | End: 2019-06-16 | Stop reason: HOSPADM

## 2019-06-16 RX ORDER — HYDROMORPHONE HYDROCHLORIDE 1 MG/ML
0.5 INJECTION, SOLUTION INTRAMUSCULAR; INTRAVENOUS; SUBCUTANEOUS
Status: DISCONTINUED | OUTPATIENT
Start: 2019-06-16 | End: 2019-06-16 | Stop reason: HOSPADM

## 2019-06-16 RX ORDER — ONDANSETRON 4 MG/1
4 TABLET, ORALLY DISINTEGRATING ORAL EVERY 6 HOURS PRN
Qty: 10 TABLET | Refills: 0 | Status: SHIPPED | OUTPATIENT
Start: 2019-06-16 | End: 2019-06-23

## 2019-06-16 RX ADMIN — SODIUM CHLORIDE 1000 ML: 9 INJECTION, SOLUTION INTRAVENOUS at 08:52

## 2019-06-16 RX ADMIN — HYDROMORPHONE HYDROCHLORIDE 0.5 MG: 1 INJECTION, SOLUTION INTRAMUSCULAR; INTRAVENOUS; SUBCUTANEOUS at 08:53

## 2019-06-16 RX ADMIN — PROCHLORPERAZINE EDISYLATE 10 MG: 5 INJECTION INTRAMUSCULAR; INTRAVENOUS at 12:10

## 2019-06-16 RX ADMIN — ONDANSETRON 4 MG: 2 INJECTION INTRAMUSCULAR; INTRAVENOUS at 08:53

## 2019-06-16 RX ADMIN — ONDANSETRON 4 MG: 2 INJECTION INTRAMUSCULAR; INTRAVENOUS at 09:41

## 2019-06-16 NOTE — ED TRIAGE NOTES
Presents to the ED with RUQ abdominal pain that began at 0130 this morning. Reports a history of gallbladder problems several years ago, states this feels similar. Also nauseated and diaphoretic.

## 2019-06-16 NOTE — ED AVS SNAPSHOT
[facility] Emergency Department  [address]  [facility] [address]  Phone:  [phone]  Fax:  [phone]                                    Ketty Watkins   MRN: [id number]    Department:  [facility] Emergency Department   Date of Visit:  6/16/2019           After Visit Summary Signature Page    I have received my discharge instructions, and my questions have been answered. I have discussed any challenges I see with this plan with the nurse or doctor.    ..........................................................................................................................................  Patient/Patient Representative Signature      ..........................................................................................................................................  Patient Representative Print Name and Relationship to Patient    ..................................................               ................................................  Date                                   Time    ..........................................................................................................................................  Reviewed by Signature/Title    ...................................................              ..............................................  Date                                               Time          22EPIC Rev 08/18

## 2019-06-16 NOTE — ED PROVIDER NOTES
History     Chief Complaint:  Abdominal Pain    HPI   Ketty Watkins is a 58 year old female with a history of gall bladder attack in 2012 who presents with abdominal pain. The patient states that she woke up to the pain at 0130 and states the pain was burning and in her right upper quadrant. The patient reports nausea and vomitting some bile up. He patient states that she took tylenol at 0130 and was able to fall back asleep until 0530 when she woke back up feeling dizzy and clammy. She states she last ate pizza last night. She denies any shortness of breath, chest pain, dysuria or diarrhea. The patient states this pain is similar to her previous gall bladder attack, but is not as sharp as before. She thinks this could be due to the fact that she has taken tylenol.     Allergies:  codeine     Medications:    Gingko biloba    Past Medical History:    Colon polyps    Past Surgical History:    Colonoscopy X2    Family History:    CAD- mother, father  Hyperlipidemia - mother, father    Social History:  The patient was accompanied to the ED by .  Smoking Status: Never Smoker  Smokeless Tobacco: Never Used  Alcohol Use: Positive  Marital Status:       Review of Systems   All other systems reviewed and are negative.      Physical Exam      Patient Vitals for the past 24 hrs:   BP Temp Temp src Pulse Resp SpO2   06/16/19 1245 (!) 162/94 -- -- 82 -- 100 %   06/16/19 1230 145/82 -- -- 88 -- --   06/16/19 1215 158/79 -- -- 82 -- 92 %   06/16/19 1200 (!) 165/96 -- -- 87 -- 93 %   06/16/19 1145 142/85 -- -- 84 -- 93 %   06/16/19 1130 163/90 -- -- 78 -- 94 %   06/16/19 1115 151/84 -- -- 80 -- 93 %   06/16/19 1100 152/85 -- -- 80 -- 92 %   06/16/19 1045 (!) 165/93 -- -- 77 -- 92 %   06/16/19 1030 166/88 -- -- 81 -- 90 %   06/16/19 1015 (!) 166/95 -- -- 81 -- 90 %   06/16/19 1000 (!) 158/101 -- -- 78 -- 90 %   06/16/19 0950 (!) 161/92 -- -- -- -- 95 %   06/16/19 0945 -- -- -- 81 -- --   06/16/19 0940 (!) 166/95 --  -- -- -- --   06/16/19 0900 (!) 155/91 -- -- 78 -- 91 %   06/16/19 0758 (!) 189/123 96.9  F (36.1  C) Temporal 91 20 95 %       Physical Exam  General: Resting on the bed.  Head: No obvious trauma to head.  Ears, Nose, Throat:  External ears normal.  Nose normal.    Eyes:  Conjunctivae clear.  Pupils are equal, round, and reactive.   Neck: Normal range of motion.  Neck supple.   CV: Regular rate and rhythm.  No murmurs.      Respiratory: Effort normal and breath sounds normal.  No wheezing or crackles.   Gastrointestinal: Soft.  No distension. There is mild RUQ and epigastric tenderness.  There is no rigidity, no rebound and no guarding.   Neuro: Alert. Moving all extremities appropriately.  Normal speech.    Skin: Skin is warm and dry.  No rash noted.     Emergency Department Course   ECG:  ECG taken at 0829, ECG read at 0840  Normal sinus rhythm  Possible left atrial enlargement   Left ventricular hypertrophy  abnormal ECG  Rate 87 bpm. AR interval 176 ms. QRS duration 86 ms. QT/QTc 388/466 ms. P-R-T axes 33 11 7.  No significant change when compared to EKG dated 9/24/11.    Imaging:  Radiology findings were communicated with the patient who voiced understanding of the findings.    US limited:  Cholelithiasis. No change.  Reading per radiology    Laboratory:  Laboratory findings were communicated with the patient who voiced understanding of the findings.      CBC: WBC 7.0, HGB 15.8(H),   CMP: Glucose 139 (H)  o/w WNL (Creatinine 0.49(L))  Troponin (Collected 0849): <0.015  Lipase: 65 (L)    UA with micro: [place] trace (A) pH urine 7.5 (H) Leukocyte esterase small (A) WBC/HPF 11 (H) bacteria few (A) squamous epithelial 3 (H) mucus present (A)  o/w negative    Interventions:  0852 NS, 1 L, IV  0853 Zofran 4 mg IV  0853 Dilaudid 0.5 mg IV  0941 Zofran 4 mg IV  1210 compazine 10 mg IV  Emergency Department Course:     Nursing notes and vitals reviewed.    0815 I performed an exam of the patient as documented  above.     0849 IV was inserted and blood was drawn for laboratory testing, results above.    0849 The patient provided a urine sample here in the emergency department. This was sent for laboratory testing, findings above.    0904 The patient was sent for a US abdomen  while in the emergency department, results above.     1008 I returned to check on patient.     1300 I personally reviewed the laboratory and imaging results with the patient  and answered all related questions prior to discharge.    Impression & Plan       Medical Decision Makin-year-old female with a history of prior biliary colic presents with abdominal pain.  Vital signs are unremarkable.  Broad differential was pursued including but not limited to cholecystitis, cholelithiasis, cholangitis, biliary colic, electrolyte metabolic renal dysfunction, hepatitis, pancreatitis, acute coronary syndrome, etc.  Patient denies any chest pain shortness breath.  EKG is relatively unremarkable without any acute ST-T wave changes.  No evidence of ischemia.  Troponin is negative.  Overall low suspicion for acute coronary syndrome.  Patient has reproducible epigastric tenderness on exam.  CBC shows no leukocytosis and mildly elevated hemoglobin effect.  BMP shows no acute electrolyte metabolic or renal dysfunction.  LFTs and lipase are reassuring not concerning for obstructive biliary process, hepatitis, pancreatitis.  UA shows small leuk esterase 11 white blood cells and few bacteria but no other concerning signs of infection.  Urine culture added on.  Patient denies any symptoms to suggest UTI.  Imaging confirms cholelithiasis.  No evidence of cholecystitis or cholangitis etc.  Patient has no focal right upper quadrant tenderness on reassessment.  Do not suspect cholecystitis or cholangitis etc.  Patient is otherwise well-appearing nontoxic.  She became quite dizzy after the Dilaudid.  Attempted to manage her nausea with fluids and Zofran.  She was also  given a dose of Compazine.  At this point think the more likely medication induced that rather than from the cholelithiasis.  She felt well enough to return.  Patient was discharged with close general surgery follow-up recommended.  Strict return precautions discussed.  Patient was discharged in stable improved condition.    Diagnosis:      ICD-10-CM    1. Right upper quadrant abdominal pain R10.11    2. Calculus of gallbladder without cholecystitis without obstruction K80.20         Disposition:   The patient is discharged to home.    Discharge Medications:START taking      Dose / Directions   ondansetron 4 MG ODT tab  Commonly known as:  ZOFRAN ODT      Dose:  4 mg  Take 1 tablet (4 mg) by mouth every 6 hours as needed for nausea  Quantity:  10 tablet  Refills:  0       Scribe Disclosure:  Lala AYALA, am serving as a scribe at 8:06 AM on 6/16/2019 to document services personally performed by Shima Schwab MD, based on my observations and the provider's statements to me.    [facility] EMERGENCY DEPARTMENT       Shima Schwab MD  06/16/19 0425

## 2019-06-16 NOTE — DISCHARGE INSTRUCTIONS
Return to the ED if you are unable to tolerate fluids, intractable nausea or vomiting, severe abdominal pain, fevers >101 or other acute changes.  Please follow up with your PCP in 2-3 days.      It appears that you have gallbladder stones, if you have increasing pain, fevers, intractable vomiting or other concerns return to the emergency department as this may be a sign of your gallbladder being infected.  Otherwise it is recommended that you follow-up with general surgery for possible gallbladder removal.    Discharge Instructions  Biliary Colic    You have been seen today for biliary colic. Biliary colic is the pain that happens when gallstones block the normal flow of bile from the gallbladder.  It usually is a steady or crampy pain in the upper abdomen (belly), most often under the right side of the rib cage where the gallbladder is. Sometimes you get pain from the gallbladder in your back or shoulder. It is common to have nausea (sick to stomach) and vomiting (throwing up) with biliary colic.    Bile is a liquid the body makes to help with digesting fat.  It is made by the liver and stored in the gallbladder and released from the gall bladder when you eat fatty foods. Gallstones can form for a variety of reasons. Risk factors for gallstones include being female, having a family history of gallstones, being older, being pregnant or having been pregnant, having diabetes, having rapid weight loss, and others.      Once gallstones form, surgeons usually tell you to have your gallbladder removed. There is medicine that can dissolve gallstones, but it can be unpleasant to take, and gallstones tend to come back when you quit taking the medicine. Your regular provider can help decide on the right treatment for you, and may refer you to a surgeon to discuss whether surgery is right in your case.     Complications of gallstones include infection, jaundice, inflammation of the pancreas, and rupture of the gallbladder.  One of these complications will happen to about one out of every four patients with gallstones over the next 10-20 years if they are not treated.       Generally, every Emergency Department visit should have a follow-up clinic visit with either a primary or a specialty clinic/provider. Please follow-up as instructed by your emergency provider today.    Return to the Emergency Department if you develop:  Fever greater than 100.5 F.   Persistent nausea and vomiting.  Pain that will not go away with the medicines you were given here.  Yellow skin or eye color (jaundice).  Other new concerning symptoms.    What can I do to help myself?  Eat regular meals at least three times a day, to make the gallbladder empty before it gets too full.  Avoid fried or fatty foods.  Drink plenty of clear fluids.  Take over-the-counter or prescribed pain medications as recommended by your provider.      If you were given a prescription for medicine here today, be sure to read all of the information (including the package insert) that comes with your prescription.  This will include important information about the medicine, its side effects, and any warnings that you need to know about.  The pharmacist who fills the prescription can provide more information and answer questions you may have about the medicine.  If you have questions or concerns that the pharmacist cannot address, please call or return to the Emergency Department.     Remember that you can always come back to the Emergency Department if you are not able to see your regular provider in the amount of time listed above, if you get any new symptoms, or if there is anything that worries you.

## 2019-06-17 LAB
BACTERIA SPEC CULT: NO GROWTH
INTERPRETATION ECG - MUSE: NORMAL
Lab: NORMAL
SPECIMEN SOURCE: NORMAL

## 2019-06-25 ENCOUNTER — OFFICE VISIT (OUTPATIENT)
Dept: FAMILY MEDICINE | Facility: CLINIC | Age: 59
End: 2019-06-25
Payer: COMMERCIAL

## 2019-06-25 VITALS
DIASTOLIC BLOOD PRESSURE: 98 MMHG | OXYGEN SATURATION: 96 % | TEMPERATURE: 99.3 F | RESPIRATION RATE: 16 BRPM | HEIGHT: 61 IN | WEIGHT: 212.3 LBS | BODY MASS INDEX: 40.08 KG/M2 | HEART RATE: 93 BPM | SYSTOLIC BLOOD PRESSURE: 146 MMHG

## 2019-06-25 DIAGNOSIS — E66.01 MORBID OBESITY (H): ICD-10-CM

## 2019-06-25 DIAGNOSIS — R73.09 ELEVATED GLUCOSE: ICD-10-CM

## 2019-06-25 DIAGNOSIS — K80.20 CALCULUS OF GALLBLADDER WITHOUT CHOLECYSTITIS WITHOUT OBSTRUCTION: Primary | ICD-10-CM

## 2019-06-25 DIAGNOSIS — J30.2 SEASONAL ALLERGIC RHINITIS, UNSPECIFIED TRIGGER: ICD-10-CM

## 2019-06-25 DIAGNOSIS — R03.0 ELEVATED BP WITHOUT DIAGNOSIS OF HYPERTENSION: ICD-10-CM

## 2019-06-25 LAB — HBA1C MFR BLD: 5.6 % (ref 0–5.6)

## 2019-06-25 PROCEDURE — 36415 COLL VENOUS BLD VENIPUNCTURE: CPT | Performed by: INTERNAL MEDICINE

## 2019-06-25 PROCEDURE — 83036 HEMOGLOBIN GLYCOSYLATED A1C: CPT | Performed by: INTERNAL MEDICINE

## 2019-06-25 PROCEDURE — 99214 OFFICE O/P EST MOD 30 MIN: CPT | Performed by: INTERNAL MEDICINE

## 2019-06-25 ASSESSMENT — MIFFLIN-ST. JEOR: SCORE: 1490.37

## 2019-06-25 NOTE — PATIENT INSTRUCTIONS
Patient Education     Eating Heart-Healthy Food: Using the DASH Plan    Eating for your heart doesn t have to be hard or boring. You just need to know how to make healthier choices. The DASH eating plan has been developed to help you do just that. DASH stands for Dietary Approaches to Stop Hypertension. It is a plan that has been proven to be healthier for your heart and to lower your risk for high blood pressure. It can also help lower your risk for cancer, heart disease, osteoporosis, and diabetes.  Choosing from each food group  Choose foods from each of the food groups below each day. Try to get the recommended number of servings for each food group. The serving numbers are based on a diet of 2,000 calories a day. Talk to your doctor if you re unsure about your calorie needs. Along with getting the correct servings, the DASH plan also recommends a sodium intake less than 2,300 mg per day.        Grains  Servings: 6 to 8 a day  A serving is:    1 slice bread    1 ounce dry cereal    Half a cup cooked rice, pasta or cereal  Best choices: Whole grains and any grains high in fiber. Vegetables  Servings: 4 to 5 a day  A serving is:    1 cup raw leafy vegetable    Half a cup cut-up raw or cooked vegetable    Half a cup vegetable juice  Best choices: Fresh or frozen vegetables prepared without added salt or fat.   Fruits  Servings: 4 to 5 a day  A serving is:    1 medium fruit    One-quarter cup dried fruit    Half a cup fresh, frozen, or canned fruit    Half a cup of 100% fruit juices  Best choices: A variety of fresh fruits of different colors. Whole fruits are a better choice than fruit juices. Low-fat or fat-free dairy  Servings: 2 to 3 a day  A serving is:    1 cup milk    1 cup yogurt    One and a half ounces cheese  Best choices: Skim or 1% milk, low-fat or fat-free yogurt or buttermilk, and low-fat cheeses.         Lean meats, poultry, fish  Servings: 6 or fewer a day  A serving is:    1 ounce cooked meats,  poultry, or fish    1 egg  Best choices: Lean poultry and fish. Trim away visible fat. Broil, grill, roast, or boil instead of frying. Remove skin from poultry before eating. Limit how much red meat you eat.  Nuts, seeds, beans  Servings: 4 to 5 a week  A serving is:    One-third cup nuts (one and a half ounces)    2 tablespoons nut butter or seeds    Half a cup cooked dry beans or legumes  Best choices: Dry roasted nuts with no salt added, lentils, kidney beans, garbanzo beans, and whole hamilton beans.   Fats and oils  Servings: 2 to 3 a day  A serving is:    1 teaspoon vegetable oil    1 teaspoon soft margarine    1 tablespoon mayonnaise    2 tablespoons salad dressing  Best choices: Nut and vegetable oils (nontropical vegetable oils), such as olive and canola oil. Sweets  Servings: 5 a week or fewer  A serving is:    1 tablespoon sugar, maple syrup, or honey    1 tablespoon jam or jelly    1 half-ounce jelly beans (about 15)    1 cup lemonade  Best choices: Dried fruit can be a satisfying sweet. Choose low-fat sweets. And watch your serving sizes!      For more on the DASH eating plan, visit:  www.nhlbi.nih.gov/health/health-topics/topics/dash   Date Last Reviewed: 6/1/2016 2000-2018 [employer]. [address] [place] [address], [place] [address]. All rights reserved. This information is not intended as a substitute for professional medical care. Always follow your healthcare professional's instructions.

## 2019-06-27 ENCOUNTER — TELEPHONE (OUTPATIENT)
Dept: FAMILY MEDICINE | Facility: CLINIC | Age: 59
End: 2019-06-27

## 2019-06-27 NOTE — TELEPHONE ENCOUNTER
Referral received from CHRISTOPHER WHARTON  for gallbladder.    Attempt #1:    Called patient at [phone].  No answer - left message for patient to return call to clinic at [phone].  Chio

## 2019-07-02 NOTE — TELEPHONE ENCOUNTER
Attempt #2:    Called patient at [phone] .  No answer - left message for patient to return call to clinic at [phone].  Chio

## 2019-07-15 NOTE — TELEPHONE ENCOUNTER
Attempt #3:    Called patient at [phone].  No answer - left message for patient to return call to clinic at [phone].  hCio Flynn return call received from patient - closing encounter.

## 2019-08-25 NOTE — PROGRESS NOTES
Subjective     Last OV with PCP 7/3/2017    Ketty Watkins is a 58 year old female who presents to clinic today for the following health issues:    HPI   ED/UC Followup:    Facility:  [facility]  Date of visit: 6/16/2019  Reason for visit: Abdominal pain  Current Status: Patient states that she is doing better since hospital visit      Known gall stone present.  Episode of cholelithiasis 2011 and 2019  Was hoping to avoid surgery; would consider in Fall, electively;       Patient Active Problem List   Diagnosis     Benign neoplasm of descending colon     Non morbid obesity, unspecified obesity type     Morbid obesity (H)     Past Surgical History:   Procedure Laterality Date     COLONOSCOPY       COLONOSCOPY Left 11/28/2017    Procedure: COLONOSCOPY;  Colonoscopy ;  Surgeon: Michelle Fisher MD;  Location:  GI       Social History     Tobacco Use     Smoking status: Never Smoker     Smokeless tobacco: Never Used   Substance Use Topics     Alcohol use: Yes     Comment: rarely     Family History   Problem Relation Age of Onset     Coronary Artery Disease Mother      Hyperlipidemia Mother      Coronary Artery Disease Father      Hyperlipidemia Father      Cancer - colorectal No family hx of          Current Outpatient Medications   Medication Sig Dispense Refill     cetirizine-psuedoePHEDrine (ZYRTEC-D) 5-120 MG per 12 hr tablet Take 1 tablet by mouth 2 times daily as needed for allergies       MEGARED OMEGA-3 KRILL  MG CAPS Take 1 capsule by mouth daily       multivitamin, therapeutic with minerals (MULTI-VITAMIN) TABS Take 1 tablet by mouth daily       Omega-3 Fatty Acids (FISH OIL OMEGA-3 PO) Take 1 capsule by mouth daily       Ginkgo Biloba 40 MG TABS Take 1 tablet by mouth daily       TURMERIC PO Take 1 tablet by mouth daily       Allergies   Allergen Reactions     Codeine Itching     Recent Labs   Lab Test 06/25/19  1025 06/16/19  0849 07/03/17  0853 09/24/11  0505   A1C 5.6  --   --   --    LDL  --   --  " 169*  --    HDL  --   --  52  --    TRIG  --   --  180*  --    ALT  --  27  --  26   CR  --  0.49*  --  0.61   GFRESTIMATED  --  >90  --  >90   GFRESTBLACK  --  >90  --  >90   POTASSIUM  --  3.5  --  3.8      BP Readings from Last 3 Encounters:   06/25/19 (!) 146/98   06/16/19 129/78   11/28/17 136/85    Wt Readings from Last 3 Encounters:   06/25/19 96.3 kg (212 lb 4.8 oz)   11/28/17 96.2 kg (212 lb)   07/03/17 96.2 kg (212 lb)           Reviewed and updated as needed this visit by Provider  Tobacco  Allergies  Meds  Problems  Med Hx  Surg Hx  Fam Hx         Review of Systems   ROS COMP: CONSTITUTIONAL: NEGATIVE for fever, chills, change in weight  EYES: NEGATIVE for vision changes or irritation  ENT/MOUTH: NEGATIVE for ear, mouth and throat problems  RESP: NEGATIVE for significant cough or SOB  CV: BLOOD PRESSURE running high today; not high when in ER;   BP Readings from Last 3 Encounters:   06/25/19 (!) 146/98   06/16/19 129/78   11/28/17 136/85     NEGATIVE for chest pain, palpitations or peripheral edema  GI: ER Records reviewed regarding RUQ pain and known gall bladder stone. Normal LFTs  Lab Results   Component Value Date    ALT 27 06/16/2019    ALT 26 09/24/2011     Lab Results   Component Value Date    AST 23 06/16/2019    AST 25 09/24/2011        MUSCULOSKELETAL: NEGATIVE for significant arthralgias or myalgia  NEURO: NEGATIVE for weakness, dizziness or paresthesias  ENDOCRINE: NEGATIVE for temperature intolerance, skin/hair changes  HEME/ALLERGY/IMMUNE: NEGATIVE for bleeding problems  PSYCHIATRIC: NEGATIVE for changes in mood or affect      Objective    BP (!) 146/98 (BP Location: Right arm, Patient Position: Chair, Cuff Size: Adult Large)   Pulse 93   Temp 99.3  F (37.4  C) (Tympanic)   Resp 16   Ht 1.549 m (5' 1\")   Wt 96.3 kg (212 lb 4.8 oz)   LMP  (LMP Unknown)   SpO2 96%   BMI 40.11 kg/m    Body mass index is 40.11 kg/m .  Physical Exam   GENERAL: healthy, alert and no " distress  NECK: no adenopathy, no asymmetry, masses, or scars and thyroid normal to palpation  RESP: lungs clear to auscultation - no rales, rhonchi or wheezes  CV: regular rate and rhythm, normal S1 S2, no S3 or S4, no murmur, click or rub, no peripheral edema and peripheral pulses strong  ABDOMEN: soft, nontender, bowel sounds normal; Gall bladder is NONTENDER and not palpable.  MS: no gross musculoskeletal defects noted, no edema  NEURO: Normal strength and tone, mentation intact and speech normal  PSYCH: mentation appears normal, affect normal/bright    Diagnostic Test Results:  Labs reviewed in Epic        Assessment & Plan     (K80.20) Calculus of gallbladder without cholecystitis without obstruction  (primary encounter diagnosis)  Comment: ER records reviewed, ultrasound noted, normal liver function tests.  Patient is content with waiting until fall to readdress possible surgical intervention.  If her symptoms were to increase he has the phone number and could schedule it sooner.  Surgical referral referral has been provided  Plan: GENERAL SURG ADULT REFERRAL          (E66.01) Morbid obesity (H)  Comment: Body mass index is 40.11 kg/m .   Plan: Encourage regular exercise, portion control and healthy diet    (R03.0) Elevated BP without diagnosis of hypertension  Comment: Previous blood pressure readings have not been elevated, including when she was in the emergency room was more of an acute exacerbation.  The elevation in her blood pressure may in fact be related to the use of Sudafed and her allergy medicine.  Discussed lifestyle treatment options.  She prefers to address with diet and exercise; provide info on DASH and reassess.  Plan: Recommend nurse visit in 2 to 4 weeks to reassess blood pressure.    (R73.09) Elevated glucose  Comment: elevated in ER, suspect it was not fasting.  no DM or GDM; check A1C   Plan: Hemoglobin A1c          (J30.2) Seasonal allergic rhinitis, unspecified trigger  Comment:  "molds, pollens.  using Zyrtec-D; encourage Zyrtec without D due to blood pressure issues.   Plan: Continue to monitor blood pressure once she is no longer using pseudoephedrine     BMI:   Estimated body mass index is 40.11 kg/m  as calculated from the following:    Height as of this encounter: 1.549 m (5' 1\").    Weight as of this encounter: 96.3 kg (212 lb 4.8 oz).   Weight management plan: Discussed healthy diet and exercise guidelines        Return in about 2 months (around 8/25/2019) for if preop needed for future  lap chano.    Luli Griffin MD  Internal Medicine   [facility]      "

## 2019-09-29 ENCOUNTER — HEALTH MAINTENANCE LETTER (OUTPATIENT)
Age: 59
End: 2019-09-29

## 2020-10-01 ENCOUNTER — OFFICE VISIT (OUTPATIENT)
Dept: URGENT CARE | Facility: URGENT CARE | Age: 60
End: 2020-10-01
Payer: COMMERCIAL

## 2020-10-01 VITALS
HEART RATE: 84 BPM | DIASTOLIC BLOOD PRESSURE: 99 MMHG | BODY MASS INDEX: 39.38 KG/M2 | TEMPERATURE: 97.9 F | OXYGEN SATURATION: 96 % | SYSTOLIC BLOOD PRESSURE: 153 MMHG | WEIGHT: 208.4 LBS

## 2020-10-01 DIAGNOSIS — N30.91 CYSTITIS WITH HEMATURIA: Primary | ICD-10-CM

## 2020-10-01 DIAGNOSIS — R82.90 NONSPECIFIC FINDING ON EXAMINATION OF URINE: ICD-10-CM

## 2020-10-01 DIAGNOSIS — R35.0 INCREASED FREQUENCY OF URINATION: ICD-10-CM

## 2020-10-01 LAB
ALBUMIN UR-MCNC: NEGATIVE MG/DL
APPEARANCE UR: CLEAR
BACTERIA #/AREA URNS HPF: ABNORMAL /HPF
BILIRUB UR QL STRIP: NEGATIVE
COLOR UR AUTO: ABNORMAL
GLUCOSE UR STRIP-MCNC: NEGATIVE MG/DL
HGB UR QL STRIP: ABNORMAL
KETONES UR STRIP-MCNC: NEGATIVE MG/DL
LEUKOCYTE ESTERASE UR QL STRIP: ABNORMAL
MUCOUS THREADS #/AREA URNS LPF: PRESENT /LPF
NITRATE UR QL: NEGATIVE
PH UR STRIP: 7 PH (ref 5–7)
RBC #/AREA URNS AUTO: ABNORMAL /HPF
SOURCE: ABNORMAL
SP GR UR STRIP: 1.01 (ref 1–1.03)
UROBILINOGEN UR STRIP-ACNC: 0.2 EU/DL (ref 0.2–1)
WBC #/AREA URNS AUTO: ABNORMAL /HPF

## 2020-10-01 PROCEDURE — 87086 URINE CULTURE/COLONY COUNT: CPT | Performed by: PHYSICIAN ASSISTANT

## 2020-10-01 PROCEDURE — 99213 OFFICE O/P EST LOW 20 MIN: CPT | Performed by: PHYSICIAN ASSISTANT

## 2020-10-01 PROCEDURE — 81001 URINALYSIS AUTO W/SCOPE: CPT | Performed by: PHYSICIAN ASSISTANT

## 2020-10-01 RX ORDER — NITROFURANTOIN 25; 75 MG/1; MG/1
100 CAPSULE ORAL 2 TIMES DAILY
Qty: 14 CAPSULE | Refills: 0 | Status: SHIPPED | OUTPATIENT
Start: 2020-10-01 | End: 2020-10-08

## 2020-10-01 NOTE — PROGRESS NOTES
Ketty Soriano to clinic today c/o 1 day hx of dysuria, urinary urgency/frequency and suprapubic area pressure. Patient reports increased sxs over night and noted some blood in urine (including a few small clots) this morning.      [facility] 1[facility]2 [facility]  No hx of kidney stones or recurrent UTI's.     Red Flag Review: Denies any abdominal pain or flank pain. Denies any F/C/N/V/D or other acute sxs.      Past Medical History:   Diagnosis Date     Colon polyps        Current Outpatient Medications   Medication     MEGARED OMEGA-3 KRILL  MG CAPS     multivitamin, therapeutic with minerals (MULTI-VITAMIN) TABS     Omega-3 Fatty Acids (FISH OIL OMEGA-3 PO)     cetirizine-psuedoePHEDrine (ZYRTEC-D) 5-120 MG per 12 hr tablet     Ginkgo Biloba 40 MG TABS     TURMERIC PO     No current facility-administered medications for this visit.        Allergies   Allergen Reactions     Codeine Itching       ROS:     CONSITUTIONAL: Denies any fever, chills or acute onset weakness  CARDIAC: Denies any CP or SOB  RESP: Neil any cough, CP or SOB    GI: Denies any abdominal pain. Denies any F/C/N/V/D.   GYN: . Menopausal since 2011. No pelvic pain. No abnormal vaginal discharge.   SKIN: Denies rash   NEURO: Denies any confusion or mental status changes      OBJECTIVE:  BP (!) 153/99   Pulse 84   Temp 97.9  F (36.6  C)   Wt 94.5 kg (208 lb 6.4 oz)   LMP  (LMP Unknown)   SpO2 96%   BMI 39.38 kg/m        GENERAL:  Very pleasant, comfortable and generally well appearing.  SKIN: No rashes.  Normal color.  Sclera clear.  CARDIAC:NORMAL - regular rate and rhythm without murmur., normal s1/s2 and without extra heart sounds  RESP: Normal - CTA without rales, rhonchi, or wheezing.  ABDOMEN:  Soft, non-tender, non-distended.  Positive normal bowel sounds.  No HSM or masses.  Positive, mild, suprapubic tenderness.  No CVA tenderness.  NEURO: Alert and oriented.  Normal speech and  mentation.  CN II/XII grossly intact.  Gait within normal limits.      Results for orders placed or performed in visit on 10/01/20   *UA reflex to Microscopic and Culture ([facility] (except [place] and [place])     Status: Abnormal    Specimen: Midstream Urine   Result Value Ref Range    Color Urine Pink     Appearance Urine Clear     Glucose Urine Negative NEG^Negative mg/dL    Bilirubin Urine Negative NEG^Negative    Ketones Urine Negative NEG^Negative mg/dL    Specific Gravity Urine 1.010 1.003 - 1.035    Blood Urine Large (A) NEG^Negative    pH Urine 7.0 5.0 - 7.0 pH    Protein Albumin Urine Negative NEG^Negative mg/dL    Urobilinogen Urine 0.2 0.2 - 1.0 EU/dL    Nitrite Urine Negative NEG^Negative    Leukocyte Esterase Urine Moderate (A) NEG^Negative    Source Midstream Urine    Urine Microscopic     Status: Abnormal   Result Value Ref Range    WBC Urine 10-25 (A) OTO5^0 - 5 /HPF    RBC Urine O - 2 OTO2^O - 2 /HPF    Bacteria Urine Moderate (A) NEG^Negative /HPF    Mucous Urine Present (A) NEG^Negative /LPF       ASSESSMENT/PLAN:    (N30.91) Cystitis with hematuria  (primary encounter diagnosis)  Plan: nitroFURantoin macrocrystal-monohydrate         (MACROBID) 100 MG capsule    October 1, 2020  Urgent Care Plan:     1. Drink extra water to flush your bladder     2. Start the antibiotics I prescribed today     3.  Take the  full course of antibiotic as prescribed.     4.  Follow-up with your primary care provider  if your symptoms do not improve after 4-6 doses  of antibiotic.     5. Follow up immediately if your symptoms worsen, if you develop fever, chills, back pain, abdominal pain, nausea, vomiting, weakness or any new symptoms.       (R35.0) Increased frequency of urination  Plan: *UA reflex to Microscopic and Culture ([place]         [facility] and         [place]), Urine Microscopic      (R82.90) Nonspecific finding on examination of urine  Plan: Urine  Culture Aerobic Bacterial

## 2020-10-01 NOTE — PATIENT INSTRUCTIONS
"  Patient Education     Bladder Infection, Female (Adult)    Urine is normally doesn't have any bacteria in it. But bacteria can get into the urinary tract from the skin around the rectum. Or they can travel in the blood from elsewhere in the body. Once they are in your urinary tract, they can cause infection in the urethra (urethritis), the bladder (cystitis), or the kidneys (pyelonephritis).  The most common place for an infection is in the bladder. This is called a bladder infection. This is one of the most common infections in women. Most bladder infections are easily treated. They are not serious unless the infection spreads to the kidney.  The phrases \"bladder infection,\" \"UTI,\" and \"cystitis\" are often used to describe the same thing. But they are not always the same. Cystitis is an inflammation of the bladder. The most common cause of cystitis is an infection.  Symptoms  The infection causes inflammation in the urethra and bladder. This causes many of the symptoms. The most common symptoms of a bladder infection are:    Pain or burning when urinating    Having to urinate more often than usual    Urgent need to urinate    Only a small amount of urine comes out    Blood in urine    Abdominal discomfort. This is usually in the lower abdomen above the pubic bone.    Cloudy urine    Strong- or bad-smelling urine    Unable to urinate (urinary retention)    Unable to hold urine in (urinary incontinence)    Fever    Loss of appetite    Confusion (in older adults)  Causes  Bladder infections are not contagious. You can't get one from someone else, from a toilet seat, or from sharing a bath.  The most common cause of bladder infections is bacteria from the bowels. The bacteria get onto the skin around the opening of the urethra. From there, they can get into the urine and travel up to the bladder, causing inflammation and infection. This usually happens because of:    Wiping improperly after urinating. Always wipe " from front to back.    Bowel incontinence    Pregnancy    Procedures such as having a catheter inserted    Older age    Not emptying your bladder. This can allow bacteria a chance to grow in your urine.    Dehydration    Constipation    Sex    Use of a diaphragm for birth control   Treatment  Bladder infections are diagnosed by a urine test. They are treated with antibiotics and usually clear up quickly without complications. Treatment helps prevent a more serious kidney infection.  Medicines  Medicines can help in the treatment of a bladder infection:    Take antibiotics until they are used up, even if you feel better. It is important to finish them to make sure the infection has cleared.    You can use acetaminophen or ibuprofen for pain, fever, or discomfort, unless another medicine was prescribed. If you have chronic liver or kidney disease, talk with your healthcare provider before using these medicines. Also talk with your provider if you've ever had a stomach ulcer or gastrointestinal bleeding, or are taking blood-thinner medicines.    If you are given phenazopydridine to reduce burning with urination, it will cause your urine to become a bright orange color. This can stain clothing.  Care and prevention  These self-care steps can help prevent future infections:    Drink plenty of fluids to prevent dehydration and flush out your bladder. Do this unless you must restrict fluids for other health reasons, or your doctor told you not to.    Proper cleaning after going to the bathroom is important. Wipe from front to back after using the toilet to prevent the spread of bacteria.    Urinate more often. Don't try to hold urine in for a long time.    Wear loose-fitting clothes and cotton underwear. Avoid tight-fitting pants.    Improve your diet and prevent constipation. Eat more fresh fruit and vegetables, and fiber, and less junk and fatty foods.    Avoid sex until your symptoms are gone.    Avoid caffeine,  alcohol, and spicy foods. These can irritate your bladder.    Urinate right after intercourse to flush out your bladder.    If you use birth control pills and have frequent bladder infections, discuss it with your doctor.  Follow-up care  Call your healthcare provider if all symptoms are not gone after 3 days of treatment. This is especially important if you have repeat infections.  If a culture was done, you will be told if your treatment needs to be changed. If directed, you can call to find out the results.  If X-rays were done, you will be told if the results will affect your treatment.  Call 911  Call 911 if any of the following occur:    Trouble breathing    Hard to wake up or confusion    Fainting or loss of consciousness    Rapid heart rate  When to seek medical advice  Call your healthcare provider right away if any of these occur:    Fever of 100.4 F (38.0 C) or higher, or as directed by your healthcare provider    Symptoms are not better by the third day of treatment    Back or belly (abdominal) pain that gets worse    Repeated vomiting, or unable to keep medicine down    Weakness or dizziness    Vaginal discharge    Pain, redness, or swelling in the outer vaginal area (labia)  Date Last Reviewed: 10/1/2016    8004-8232 [employer]. [address] [place] [address]. All rights reserved. This information is not intended as a substitute for professional medical care. Always follow your healthcare professional's instructions.             October 1, 2020  Urgent Care Plan:     1. Drink extra water to flush your bladder     2. Start the antibiotics I prescribed today     3.  Take the  full course of antibiotic as prescribed.     4.  Follow-up with your primary care provider  if your symptoms do not improve after 4-6 doses  of antibiotic.     5. Follow up immediately if your symptoms worsen, if you develop fever, chills, back pain, abdominal pain, nausea, vomiting, weakness or any new  symptoms.

## 2020-10-02 ENCOUNTER — MYC MEDICAL ADVICE (OUTPATIENT)
Dept: FAMILY MEDICINE | Facility: CLINIC | Age: 60
End: 2020-10-02

## 2020-10-02 DIAGNOSIS — R73.09 ELEVATED GLUCOSE: Primary | ICD-10-CM

## 2020-10-02 DIAGNOSIS — E66.01 MORBID OBESITY (H): ICD-10-CM

## 2020-10-02 DIAGNOSIS — Z13.6 CARDIOVASCULAR SCREENING; LDL GOAL LESS THAN 130: ICD-10-CM

## 2020-10-02 LAB
BACTERIA SPEC CULT: NORMAL
SPECIMEN SOURCE: NORMAL

## 2020-10-02 NOTE — TELEPHONE ENCOUNTER
Patient has lab only appt on 11/10 and appt with DN 11/12. Requesting fasting lab orders.    Recent Labs   Lab Test 07/03/17  0853   CHOL 257*   HDL 52   *   TRIG 180*     Please associate dx and sign if approved.    Sarah Gracia RN

## 2020-10-09 ENCOUNTER — ANCILLARY PROCEDURE (OUTPATIENT)
Dept: MAMMOGRAPHY | Facility: CLINIC | Age: 60
End: 2020-10-09
Payer: COMMERCIAL

## 2020-10-09 DIAGNOSIS — Z12.31 VISIT FOR SCREENING MAMMOGRAM: ICD-10-CM

## 2020-10-09 PROCEDURE — 77067 SCR MAMMO BI INCL CAD: CPT | Performed by: RADIOLOGY

## 2020-11-10 DIAGNOSIS — Z13.6 CARDIOVASCULAR SCREENING; LDL GOAL LESS THAN 130: ICD-10-CM

## 2020-11-10 DIAGNOSIS — E66.01 MORBID OBESITY (H): ICD-10-CM

## 2020-11-10 DIAGNOSIS — R73.09 ELEVATED GLUCOSE: ICD-10-CM

## 2020-11-10 LAB
ALBUMIN SERPL-MCNC: 3.7 G/DL (ref 3.4–5)
ALP SERPL-CCNC: 127 U/L (ref 40–150)
ALT SERPL W P-5'-P-CCNC: 24 U/L (ref 0–50)
ANION GAP SERPL CALCULATED.3IONS-SCNC: 5 MMOL/L (ref 3–14)
AST SERPL W P-5'-P-CCNC: 21 U/L (ref 0–45)
BILIRUB SERPL-MCNC: 0.4 MG/DL (ref 0.2–1.3)
BUN SERPL-MCNC: 16 MG/DL (ref 7–30)
CALCIUM SERPL-MCNC: 9 MG/DL (ref 8.5–10.1)
CHLORIDE SERPL-SCNC: 107 MMOL/L (ref 94–109)
CHOLEST SERPL-MCNC: 260 MG/DL
CO2 SERPL-SCNC: 28 MMOL/L (ref 20–32)
CREAT SERPL-MCNC: 0.6 MG/DL (ref 0.52–1.04)
GFR SERPL CREATININE-BSD FRML MDRD: >90 ML/MIN/{1.73_M2}
GLUCOSE SERPL-MCNC: 89 MG/DL (ref 70–99)
HBA1C MFR BLD: 5.4 % (ref 0–5.6)
HDLC SERPL-MCNC: 44 MG/DL
LDLC SERPL CALC-MCNC: 173 MG/DL
NONHDLC SERPL-MCNC: 216 MG/DL
POTASSIUM SERPL-SCNC: 4.2 MMOL/L (ref 3.4–5.3)
PROT SERPL-MCNC: 7.3 G/DL (ref 6.8–8.8)
SODIUM SERPL-SCNC: 140 MMOL/L (ref 133–144)
TRIGL SERPL-MCNC: 217 MG/DL

## 2020-11-10 PROCEDURE — 80053 COMPREHEN METABOLIC PANEL: CPT | Performed by: INTERNAL MEDICINE

## 2020-11-10 PROCEDURE — 83036 HEMOGLOBIN GLYCOSYLATED A1C: CPT | Performed by: INTERNAL MEDICINE

## 2020-11-10 PROCEDURE — 80061 LIPID PANEL: CPT | Performed by: INTERNAL MEDICINE

## 2020-11-10 PROCEDURE — 36415 COLL VENOUS BLD VENIPUNCTURE: CPT | Performed by: INTERNAL MEDICINE

## 2020-11-12 ENCOUNTER — OFFICE VISIT (OUTPATIENT)
Dept: FAMILY MEDICINE | Facility: CLINIC | Age: 60
End: 2020-11-12
Payer: COMMERCIAL

## 2020-11-12 VITALS
TEMPERATURE: 98.4 F | RESPIRATION RATE: 16 BRPM | OXYGEN SATURATION: 96 % | SYSTOLIC BLOOD PRESSURE: 138 MMHG | WEIGHT: 209 LBS | BODY MASS INDEX: 39.46 KG/M2 | HEIGHT: 61 IN | HEART RATE: 80 BPM | DIASTOLIC BLOOD PRESSURE: 84 MMHG

## 2020-11-12 DIAGNOSIS — Z00.00 ROUTINE GENERAL MEDICAL EXAMINATION AT A HEALTH CARE FACILITY: Primary | ICD-10-CM

## 2020-11-12 DIAGNOSIS — E78.5 HYPERLIPIDEMIA LDL GOAL <130: ICD-10-CM

## 2020-11-12 DIAGNOSIS — E66.01 MORBID OBESITY (H): ICD-10-CM

## 2020-11-12 PROCEDURE — 99396 PREV VISIT EST AGE 40-64: CPT | Performed by: INTERNAL MEDICINE

## 2020-11-12 PROCEDURE — 87624 HPV HI-RISK TYP POOLED RSLT: CPT | Performed by: INTERNAL MEDICINE

## 2020-11-12 PROCEDURE — G0145 SCR C/V CYTO,THINLAYER,RESCR: HCPCS | Performed by: INTERNAL MEDICINE

## 2020-11-12 ASSESSMENT — ENCOUNTER SYMPTOMS
ABDOMINAL PAIN: 0
CHILLS: 0
HEMATURIA: 0
DIARRHEA: 0
COUGH: 0
CONSTIPATION: 0
HEMATOCHEZIA: 0

## 2020-11-12 ASSESSMENT — MIFFLIN-ST. JEOR: SCORE: 1465.4

## 2020-11-12 NOTE — LETTER
November 20, 2020      Ketty Watkins  [address] [facility] [place] [address]        Dear ,    We are happy to inform you that your recent Pap smear and Human Papillomavirus (HPV) test results are normal and negative.    It is recommended that you have your next Pap smear in 3 years. You will also need to return to the clinic every year for an annual wellness visit.    If you have additional questions regarding this result, please contact our office and we will be happy to assist you.      Sincerely,    Your [facility] Team

## 2020-11-12 NOTE — PATIENT INSTRUCTIONS
Preventive Health Recommendations  Female Ages 50 - 64    Yearly exam: See your health care provider every year in order to  o Review health changes.   o Discuss preventive care.    o Review your medicines if your doctor has prescribed any.      Get a Pap test every three years (unless you have an abnormal result and your provider advises testing more often).    If you get Pap tests with HPV test, you only need to test every 5 years, unless you have an abnormal result.     You do not need a Pap test if your uterus was removed (hysterectomy) and you have not had cancer.    You should be tested each year for STDs (sexually transmitted diseases) if you're at risk.     Have a mammogram every 1 to 2 years.    Have a colonoscopy at age 50, or have a yearly FIT test (stool test). These exams screen for colon cancer.      Have a cholesterol test every 5 years, or more often if advised.    Have a diabetes test (fasting glucose) every three years. If you are at risk for diabetes, you should have this test more often.     If you are at risk for osteoporosis (brittle bone disease), think about having a bone density scan (DEXA).    Shots: Get a flu shot each year. Get a tetanus shot every 10 years.    Nutrition:     Eat at least 5 servings of fruits and vegetables each day.    Eat whole-grain bread, whole-wheat pasta and brown rice instead of white grains and rice.    Get adequate Calcium and Vitamin D.     Lifestyle    Exercise at least 150 minutes a week (30 minutes a day, 5 days a week). This will help you control your weight and prevent disease.    Limit alcohol to one drink per day.    No smoking.     Wear sunscreen to prevent skin cancer.     See your dentist every six months for an exam and cleaning.    See your eye doctor every 1 to 2 years.        The 10-year ASCVD risk score (Mortonrosendo TONG Jr., et al., 2013) is: 4.6%    Values used to calculate the score:      Age: 60 years      Sex: Female      Is Non-   American: No      Diabetic: No      Tobacco smoker: No      Systolic Blood Pressure: 138 mmHg      Is BP treated: No      HDL Cholesterol: 44 mg/dL      Total Cholesterol: 260 mg/dL      If risk greater than 7.5%, then statin therapy should be considered.

## 2020-11-12 NOTE — PROGRESS NOTES
Chief Complaint   Patient presents with     Physical        SUBJECTIVE:   CC: Ketty Watkins is an 60 year old woman who presents for preventive health visit.       Patient has been advised of split billing requirements and indicates understanding: Yes  Healthy Habits:     Getting at least 3 servings of Calcium per day:  Yes    Bi-annual eye exam:  NO    Dental care twice a year:  Yes    Sleep apnea or symptoms of sleep apnea:  Sleep apnea    Diet:  Regular (no restrictions)    Frequency of exercise:  4-5 days/week    Duration of exercise:  15-30 minutes    Taking medications regularly:  Not Applicable    Medication side effects:  Not applicable    PHQ-2 Total Score: 0    Additional concerns today:  No      Today's PHQ-2 Score:   PHQ-2 ( 1999 [employer]) 11/12/2020   Q1: Little interest or pleasure in doing things 0   Q2: Feeling down, depressed or hopeless 0   PHQ-2 Score 0   Q1: Little interest or pleasure in doing things Not at all   Q2: Feeling down, depressed or hopeless Not at all   PHQ-2 Score 0       Abuse: Current or Past (Physical, Sexual or Emotional) - No  Do you feel safe in your environment? Yes    Have you ever done Advance Care Planning? (For example, a Health Directive, POLST, or a discussion with a medical provider or your loved ones about your wishes): No, advance care planning information given to patient to review.  Patient declined advance care planning discussion at this time.    Social History     Tobacco Use     Smoking status: Never Smoker     Smokeless tobacco: Never Used   Substance Use Topics     Alcohol use: Yes     Comment: rarely         Alcohol Use 11/12/2020   Prescreen: >3 drinks/day or >7 drinks/week? No   Prescreen: >3 drinks/day or >7 drinks/week? -       Reviewed orders with patient.  Reviewed health maintenance and updated orders accordingly - Yes  Labs reviewed in EPIC  BP Readings from Last 3 Encounters:   11/12/20 138/84   10/01/20 (!) 153/99   06/25/19 (!) 146/98    Wt  Readings from Last 3 Encounters:   11/12/20 94.8 kg (209 lb)   10/01/20 94.5 kg (208 lb 6.4 oz)   06/25/19 96.3 kg (212 lb 4.8 oz)                  Patient Active Problem List   Diagnosis     Benign neoplasm of descending colon     Non morbid obesity, unspecified obesity type     Morbid obesity (H)     Past Surgical History:   Procedure Laterality Date     COLONOSCOPY       COLONOSCOPY Left 11/28/2017    Procedure: COLONOSCOPY;  Colonoscopy ;  Surgeon: Michelle Fisher MD;  Location:  GI       Social History     Tobacco Use     Smoking status: Never Smoker     Smokeless tobacco: Never Used   Substance Use Topics     Alcohol use: Yes     Comment: rarely     Family History   Problem Relation Age of Onset     Coronary Artery Disease Mother      Hyperlipidemia Mother      Coronary Artery Disease Father      Hyperlipidemia Father      Cancer - colorectal No family hx of          Current Outpatient Medications   Medication Sig Dispense Refill     cetirizine-psuedoePHEDrine (ZYRTEC-D) 5-120 MG per 12 hr tablet Take 1 tablet by mouth 2 times daily as needed for allergies       multivitamin, therapeutic with minerals (MULTI-VITAMIN) TABS Take 1 tablet by mouth daily       Omega-3 Fatty Acids (FISH OIL OMEGA-3 PO) Take 1 capsule by mouth daily       Allergies   Allergen Reactions     Codeine Itching     Recent Labs   Lab Test 11/10/20  0730 06/25/19  1025 06/16/19  0849 07/03/17  0853   A1C 5.4 5.6  --   --    *  --   --  169*   HDL 44*  --   --  52   TRIG 217*  --   --  180*   ALT 24  --  27  --    CR 0.60  --  0.49*  --    GFRESTIMATED >90  --  >90  --    GFRESTBLACK >90  --  >90  --    POTASSIUM 4.2  --  3.5  --         Mammogram Screening: Patient over age 50, mutual decision to screen reflected in health maintenance.    Pertinent mammograms are reviewed under the imaging tab.  History of abnormal Pap smear: NO - age 30- 65 PAP every 3 years recommended  PAP / HPV Latest Ref Rng & Units 7/3/2017   PAP - NIL  "  HPV 16 DNA NEG Negative   HPV 18 DNA NEG Negative   OTHER HR HPV NEG Negative     Reviewed and updated as needed this visit by clinical staff  Tobacco  Allergies  Meds   Med Hx  Surg Hx  Fam Hx  Soc Hx        Reviewed and updated as needed this visit by Provider  Tobacco  Allergies  Meds  Problems  Med Hx  Surg Hx  Fam Hx         Past Medical History:   Diagnosis Date     Colon polyps       Past Surgical History:   Procedure Laterality Date     COLONOSCOPY       COLONOSCOPY Left 11/28/2017    Procedure: COLONOSCOPY;  Colonoscopy ;  Surgeon: Michelle Fisher MD;  Location:  GI       Review of Systems   Constitutional: Negative for chills.   HENT: Negative for congestion.    Respiratory: Negative for cough.    Cardiovascular: Negative for chest pain.   Gastrointestinal: Negative for abdominal pain, constipation, diarrhea and hematochezia.   Breasts:  negative.    Genitourinary: Negative for hematuria.   Psychiatric/Behavioral: Negative for mood changes.          OBJECTIVE:   /84   Pulse 80   Temp 98.4  F (36.9  C) (Oral)   Resp 16   Ht 1.549 m (5' 1\")   Wt 94.8 kg (209 lb)   LMP  (LMP Unknown)   SpO2 96%   BMI 39.49 kg/m    Physical Exam  GENERAL: healthy, alert and no distress  EYES: Eyes grossly normal to inspection  HENT: ear canals and TM's normal, nose and mouth without ulcers or lesions  NECK: no adenopathy, no asymmetry, masses, or scars and thyroid normal to palpation  RESP: lungs clear to auscultation - no rales, rhonchi or wheezes  BREAST: normal without masses, tenderness or nipple discharge and no palpable axillary masses or adenopathy  CV: regular rate and rhythm, normal S1 S2, no S3 or S4, no murmur, click or rub, no peripheral edema and peripheral pulses strong  ABDOMEN: soft, nontender, no hepatosplenomegaly, no masses and bowel sounds normal   (female): normal female external genitalia, normal urethral meatus , vaginal mucosa pink, moist, well rugated, normal " "cervix, adnexae, and uterus without masses. and pap obtained.   MS: no gross musculoskeletal defects noted, no edema  NEURO: Normal strength and tone, sensory exam grossly normal, mentation intact, gait normal including heel/toe/tandem walking and Romberg normal  PSYCH: mentation appears normal, affect normal/bright    Diagnostic Test Results:  Labs reviewed in Epic    ASSESSMENT/PLAN:   (Z00.00) Routine general medical examination at a health care facility  (primary encounter diagnosis)  Comment: HEALTH CARE MAINTENANCE reviewed  Plan: Pap imaged thin layer screen with HPV -         recommended age 30 - 65 years (select HPV order        below), HPV High Risk Types DNA Cervical          (E66.01) Morbid obesity (H)  Comment: Body mass index is 39.49 kg/m .   Plan: encourage healthy diet and regular exercise.     (E78.5) Hyperlipidemia LDL goal <130  Comment:  Pooled Cohort risk assessment 4.6 % 11/12/2020  Plan: discussed increased risk if Pooled cohort risk assessment is >7.5 %    Patient has been advised of split billing requirements and indicates understanding: Yes  COUNSELING:  Reviewed preventive health counseling, as reflected in patient instructions       Regular exercise       Healthy diet/nutrition    Estimated body mass index is 39.49 kg/m  as calculated from the following:    Height as of this encounter: 1.549 m (5' 1\").    Weight as of this encounter: 94.8 kg (209 lb).    Weight management plan: Discussed healthy diet and exercise guidelines    She reports that she has never smoked. She has never used smokeless tobacco.      Counseling Resources:  ATP IV Guidelines  Pooled Cohorts Equation Calculator  Breast Cancer Risk Calculator  BRCA-Related Cancer Risk Assessment: FHS-7 Tool  FRAX Risk Assessment  ICSI Preventive Guidelines  Dietary Guidelines for Americans, 2010  USDA's MyPlate  ASA Prophylaxis  Lung CA Screening    Luli Griffin MD  Internal Medicine   [facility]  "

## 2020-11-17 LAB
COPATH REPORT: NORMAL
PAP: NORMAL

## 2020-11-19 LAB
FINAL DIAGNOSIS: NORMAL
HPV HR 12 DNA CVX QL NAA+PROBE: NEGATIVE
HPV16 DNA SPEC QL NAA+PROBE: NEGATIVE
HPV18 DNA SPEC QL NAA+PROBE: NEGATIVE
SPECIMEN DESCRIPTION: NORMAL
SPECIMEN SOURCE CVX/VAG CYTO: NORMAL

## 2021-01-14 ENCOUNTER — HEALTH MAINTENANCE LETTER (OUTPATIENT)
Age: 61
End: 2021-01-14

## 2021-08-23 ENCOUNTER — MYC MEDICAL ADVICE (OUTPATIENT)
Dept: FAMILY MEDICINE | Facility: CLINIC | Age: 61
End: 2021-08-23

## 2021-08-23 ENCOUNTER — LAB (OUTPATIENT)
Dept: URGENT CARE | Facility: URGENT CARE | Age: 61
End: 2021-08-23
Attending: EMERGENCY MEDICINE
Payer: COMMERCIAL

## 2021-08-23 ENCOUNTER — E-VISIT (OUTPATIENT)
Dept: URGENT CARE | Facility: CLINIC | Age: 61
End: 2021-08-23
Payer: COMMERCIAL

## 2021-08-23 DIAGNOSIS — R09.81 NASAL CONGESTION: Primary | ICD-10-CM

## 2021-08-23 DIAGNOSIS — R09.81 CONGESTION OF PARANASAL SINUS: Primary | ICD-10-CM

## 2021-08-23 DIAGNOSIS — R09.81 NASAL CONGESTION: ICD-10-CM

## 2021-08-23 LAB — SARS-COV-2 RNA RESP QL NAA+PROBE: NEGATIVE

## 2021-08-23 PROCEDURE — U0003 INFECTIOUS AGENT DETECTION BY NUCLEIC ACID (DNA OR RNA); SEVERE ACUTE RESPIRATORY SYNDROME CORONAVIRUS 2 (SARS-COV-2) (CORONAVIRUS DISEASE [COVID-19]), AMPLIFIED PROBE TECHNIQUE, MAKING USE OF HIGH THROUGHPUT TECHNOLOGIES AS DESCRIBED BY CMS-2020-01-R: HCPCS

## 2021-08-23 PROCEDURE — U0005 INFEC AGEN DETEC AMPLI PROBE: HCPCS

## 2021-08-23 PROCEDURE — 99421 OL DIG E/M SVC 5-10 MIN: CPT | Performed by: EMERGENCY MEDICINE

## 2021-08-23 RX ORDER — AZITHROMYCIN 250 MG/1
TABLET, FILM COATED ORAL
Qty: 6 TABLET | Refills: 0 | Status: SHIPPED | OUTPATIENT
Start: 2021-08-23 | End: 2021-08-28

## 2021-08-23 NOTE — TELEPHONE ENCOUNTER
Will forward to Dr. Obed Hinton and the Urgent Care pool.          Script send in now. Sorry for the confusion

## 2021-08-23 NOTE — PATIENT INSTRUCTIONS
"    Dear Ketty Watkins    After reviewing your responses, I've been able to diagnose you with \"Bronchitis\" which is a common infection of your lungs. While this is most commonly caused by a virus, the symptoms you have given suggest you should be treated with antibiotics.     I have sent Z-Prince to your pharmacy to treat this infection.  You also need to be tested for Covid.  I have ordered that test and a  will call you today.  It is important that you take all of your prescribed medication even if your symptoms are improving after a few doses. Taking all of your medicine helps prevent the symptoms from returning.     If your symptoms worsen, you develop chest pain or shortness of breath, fevers over 101, or are not improving in 5 days, please contact your primary care provider for an appointment or visit any of our convenient Walk-in Care or Urgent Care Centers to be seen which can be found on our website here.    Thanks again for choosing us as your health care partner,    Obed Hinton MD    "

## 2021-09-24 ENCOUNTER — OFFICE VISIT (OUTPATIENT)
Dept: FAMILY MEDICINE | Facility: CLINIC | Age: 61
End: 2021-09-24
Payer: COMMERCIAL

## 2021-09-24 VITALS
RESPIRATION RATE: 20 BRPM | TEMPERATURE: 98.4 F | BODY MASS INDEX: 39.49 KG/M2 | HEART RATE: 90 BPM | OXYGEN SATURATION: 96 % | DIASTOLIC BLOOD PRESSURE: 92 MMHG | WEIGHT: 209 LBS | SYSTOLIC BLOOD PRESSURE: 145 MMHG

## 2021-09-24 DIAGNOSIS — R03.0 ELEVATED BP WITHOUT DIAGNOSIS OF HYPERTENSION: ICD-10-CM

## 2021-09-24 DIAGNOSIS — J40 BRONCHITIS: Primary | ICD-10-CM

## 2021-09-24 PROCEDURE — 99213 OFFICE O/P EST LOW 20 MIN: CPT | Performed by: NURSE PRACTITIONER

## 2021-09-24 NOTE — NURSING NOTE
"Chief Complaint   Patient presents with     Cough     Initial BP (!) 148/100 (BP Location: Right arm, Patient Position: Sitting, Cuff Size: Adult Large)   Pulse 90   Temp 98.4  F (36.9  C) (Oral)   Resp 20   Wt 94.8 kg (209 lb)   LMP  (LMP Unknown)   SpO2 96%   BMI 39.49 kg/m   Estimated body mass index is 39.49 kg/m  as calculated from the following:    Height as of 11/12/20: 1.549 m (5' 1\").    Weight as of this encounter: 94.8 kg (209 lb).  BP completed using cuff size large right arm    Lisa Magill, CMA    "

## 2021-09-24 NOTE — PROGRESS NOTES
Assessment & Plan     Elevated BP without diagnosis of hypertension  Advised not to take Sudafed daily.  Recheck in a couple weeks.     Bronchitis  Resolving.             No follow-ups on file.    MARIA ESTHER Morataya CNP  [facility] CHAD Hdez is a 60 year old who presents for the following health issues     HPI     Acute Illness  Acute illness concerns: cough   Onset/Duration: since early AUGUST. Patient wears a C-PAP machine.     Symptoms:  Fever: no  Chills/Sweats: no  Headache (location?): no  Sinus Pressure: no  Conjunctivitis:  no  Ear Pain: no  Rhinorrhea: no  Congestion: YES- chest   Sore Throat: no  Cough: YES-productive of clear sputum, productive of yellow sputum, productive of green sputum, improving over time/mainly in the morning   Wheeze: no  Decreased Appetite: no  Nausea: no  Vomiting: no  Diarrhea: no  Dysuria/Freq.: no  Dysuria or Hematuria: no  Fatigue/Achiness: YES- fatigue   Sick/Strep Exposure: no  Therapies tried and outcome: Z-cate and zyrtec CHRISTY Hinesin.  Has helped, but still coughing.     Treated for bronchitis at the end of august with a zpak.  Symptoms improved, but still has a cough.   Cough occurs in the morning and improves as day goes on.   Greenish bits of phlegm.   Denies CP, SOB, fevers.   Covid test was negative.   Has a hx of year round allergies, hasn't done allergy shots in many years.   Has had one covid vaccine and due for second dose tomorrow.     Review of Systems   Constitutional, HEENT, cardiovascular, pulmonary, gi and gu systems are negative, except as otherwise noted.      Objective    BP (!) 145/92   Pulse 90   Temp 98.4  F (36.9  C) (Oral)   Resp 20   Wt 94.8 kg (209 lb)   LMP  (LMP Unknown)   SpO2 96%   BMI 39.49 kg/m    Body mass index is 39.49 kg/m .  Physical Exam   GENERAL: healthy, alert and no distress  EYES: Eyes grossly normal to inspection and conjunctivae and sclerae normal  HENT: ear canals and TM's  normal, nose and mouth without ulcers or lesions  NECK: no adenopathy, no asymmetry, masses, or scars and thyroid normal to palpation  RESP: lungs clear to auscultation - no rales, rhonchi or wheezes  CV: regular rate and rhythm, normal S1 S2, no S3 or S4, no murmur, click or rub, no peripheral edema and peripheral pulses strong    No results found for this or any previous visit (from the past 24 hour(s)).

## 2021-10-13 ENCOUNTER — TRANSFERRED RECORDS (OUTPATIENT)
Dept: HEALTH INFORMATION MANAGEMENT | Facility: CLINIC | Age: 61
End: 2021-10-13

## 2021-12-19 ENCOUNTER — HEALTH MAINTENANCE LETTER (OUTPATIENT)
Age: 61
End: 2021-12-19

## 2022-10-15 ENCOUNTER — HEALTH MAINTENANCE LETTER (OUTPATIENT)
Age: 62
End: 2022-10-15

## 2022-10-26 ENCOUNTER — VIRTUAL VISIT (OUTPATIENT)
Dept: FAMILY MEDICINE | Facility: CLINIC | Age: 62
End: 2022-10-26
Payer: COMMERCIAL

## 2022-10-26 DIAGNOSIS — J01.90 ACUTE SINUSITIS WITH SYMPTOMS > 10 DAYS: ICD-10-CM

## 2022-10-26 DIAGNOSIS — J40 BRONCHITIS: Primary | ICD-10-CM

## 2022-10-26 PROCEDURE — 99213 OFFICE O/P EST LOW 20 MIN: CPT | Mod: 95 | Performed by: FAMILY MEDICINE

## 2022-10-26 RX ORDER — AZITHROMYCIN 250 MG/1
TABLET, FILM COATED ORAL
Qty: 6 TABLET | Refills: 0 | Status: SHIPPED | OUTPATIENT
Start: 2022-10-26 | End: 2024-01-25

## 2022-10-26 NOTE — PROGRESS NOTES
Ketty is a 62 year old who is being evaluated via a billable telephone visit.      What phone number would you like to be contacted at?[phone]  How would you like to obtain your AVS? MyChart    Assessment & Plan     Bronchitis  COVID test is negative however she continued to have some upper respiratory symptoms including cough congestion nasal tenderness postnasal drainage and some thick mucus.  Suggested continue symptomatic care will add azithromycin to her regimen to see if that helps.  - azithromycin (ZITHROMAX) 250 MG tablet; Two tablets first day, then one tablet daily for four days.    Acute sinusitis with symptoms > 10 days    - azithromycin (ZITHROMAX) 250 MG tablet; Two tablets first day, then one tablet daily for four days.    6}         Return in about 2 weeks (around 11/9/2022) for if symptom does not get better.    Jerry Stewart MD  [facility]   Ketty is a 62 year old presenting for the following health issues:  Consult (Cough and green mucus , pinkish blood in nose )      History of Present Illness       Reason for visit:  Coughing up green chunks  and bloody nose when blowing  Symptom onset:  3-7 days ago  Symptoms include:  Cough and post nasal drip  Symptom intensity:  Moderate  Symptom progression:  Staying the same  Had these symptoms before:  Yes  Has tried/received treatment for these symptoms:  Yes  Previous treatment was successful:  Yes  Prior treatment description:  Z pack  What makes it worse:  Not sure  What makes it better:  Something for upper respiratory and sinus    She eats 2-3 servings of fruits and vegetables daily.She consumes 1 sweetened beverage(s) daily.She exercises with enough effort to increase her heart rate 9 or less minutes per day.  She exercises with enough effort to increase her heart rate 3 or less days per week.   She is taking medications regularly.                 Review of Systems   Constitutional, HEENT,  cardiovascular, pulmonary, gi and gu systems are negative, except as otherwise noted.      Objective           Vitals:  No vitals were obtained today due to virtual visit.    Physical Exam   healthy, alert and no distress  PSYCH: Alert and oriented times 3; coherent speech, normal   rate and volume, able to articulate logical thoughts, able   to abstract reason, no tangential thoughts, no hallucinations   or delusions  Her affect is normal  RESP: No cough, no audible wheezing, able to talk in full sentences  Remainder of exam unable to be completed due to telephone visits            Phone call duration: 6 minutes

## 2022-12-16 ENCOUNTER — TRANSFERRED RECORDS (OUTPATIENT)
Dept: HEALTH INFORMATION MANAGEMENT | Facility: CLINIC | Age: 62
End: 2022-12-16

## 2023-03-15 ENCOUNTER — TELEPHONE (OUTPATIENT)
Dept: FAMILY MEDICINE | Facility: CLINIC | Age: 63
End: 2023-03-15

## 2023-03-15 NOTE — TELEPHONE ENCOUNTER
Patient Quality Outreach    Patient is due for the following:   Colon Cancer Screening  Cervical Cancer Screening - PAP Needed  Physical Preventive Adult Physical    Next Steps:   Patient was assigned appropriate questionnaire to complete    Type of outreach:    Sent [employer] message.      Questions for provider review:    None     Yassine Flores MA

## 2023-03-26 ENCOUNTER — HEALTH MAINTENANCE LETTER (OUTPATIENT)
Age: 63
End: 2023-03-26

## 2023-07-09 ENCOUNTER — HOSPITAL ENCOUNTER (EMERGENCY)
Facility: CLINIC | Age: 63
Discharge: HOME | End: 2023-07-09
Attending: EMERGENCY MEDICINE | Admitting: EMERGENCY MEDICINE
Payer: COMMERCIAL

## 2023-07-09 ENCOUNTER — APPOINTMENT (OUTPATIENT)
Dept: ULTRASOUND IMAGING | Facility: CLINIC | Age: 63
End: 2023-07-09
Attending: EMERGENCY MEDICINE
Payer: COMMERCIAL

## 2023-07-09 VITALS
TEMPERATURE: 97.8 F | HEART RATE: 82 BPM | RESPIRATION RATE: 20 BRPM | DIASTOLIC BLOOD PRESSURE: 105 MMHG | OXYGEN SATURATION: 95 % | SYSTOLIC BLOOD PRESSURE: 168 MMHG

## 2023-07-09 DIAGNOSIS — K80.50 BILIARY COLIC: ICD-10-CM

## 2023-07-09 LAB
ALBUMIN SERPL BCG-MCNC: 4.2 G/DL (ref 3.5–5.2)
ALP SERPL-CCNC: 132 U/L (ref 35–104)
ALT SERPL W P-5'-P-CCNC: 29 U/L (ref 0–50)
ANION GAP SERPL CALCULATED.3IONS-SCNC: 9 MMOL/L (ref 7–15)
AST SERPL W P-5'-P-CCNC: 51 U/L (ref 0–45)
BASOPHILS # BLD AUTO: 0 10E3/UL (ref 0–0.2)
BASOPHILS NFR BLD AUTO: 1 %
BILIRUB SERPL-MCNC: 0.5 MG/DL
BUN SERPL-MCNC: 12.3 MG/DL (ref 8–23)
CALCIUM SERPL-MCNC: 9.3 MG/DL (ref 8.8–10.2)
CHLORIDE SERPL-SCNC: 104 MMOL/L (ref 98–107)
CREAT SERPL-MCNC: 0.72 MG/DL (ref 0.51–0.95)
DEPRECATED HCO3 PLAS-SCNC: 28 MMOL/L (ref 22–29)
EOSINOPHIL # BLD AUTO: 0.1 10E3/UL (ref 0–0.7)
EOSINOPHIL NFR BLD AUTO: 2 %
ERYTHROCYTE [DISTWIDTH] IN BLOOD BY AUTOMATED COUNT: 12.1 % (ref 10–15)
GFR SERPL CREATININE-BSD FRML MDRD: >90 ML/MIN/1.73M2
GLUCOSE SERPL-MCNC: 107 MG/DL (ref 70–99)
HCT VFR BLD AUTO: 47.5 % (ref 35–47)
HGB BLD-MCNC: 15.6 G/DL (ref 11.7–15.7)
HOLD SPECIMEN: NORMAL
IMM GRANULOCYTES # BLD: 0 10E3/UL
IMM GRANULOCYTES NFR BLD: 1 %
LIPASE SERPL-CCNC: 20 U/L (ref 13–60)
LYMPHOCYTES # BLD AUTO: 2.1 10E3/UL (ref 0.8–5.3)
LYMPHOCYTES NFR BLD AUTO: 33 %
MCH RBC QN AUTO: 30.4 PG (ref 26.5–33)
MCHC RBC AUTO-ENTMCNC: 32.8 G/DL (ref 31.5–36.5)
MCV RBC AUTO: 92 FL (ref 78–100)
MONOCYTES # BLD AUTO: 0.4 10E3/UL (ref 0–1.3)
MONOCYTES NFR BLD AUTO: 6 %
NEUTROPHILS # BLD AUTO: 3.8 10E3/UL (ref 1.6–8.3)
NEUTROPHILS NFR BLD AUTO: 57 %
NRBC # BLD AUTO: 0 10E3/UL
NRBC BLD AUTO-RTO: 0 /100
PLATELET # BLD AUTO: 289 10E3/UL (ref 150–450)
POTASSIUM SERPL-SCNC: 3.9 MMOL/L (ref 3.4–5.3)
PROT SERPL-MCNC: 7 G/DL (ref 6.4–8.3)
RBC # BLD AUTO: 5.14 10E6/UL (ref 3.8–5.2)
SODIUM SERPL-SCNC: 141 MMOL/L (ref 136–145)
WBC # BLD AUTO: 6.5 10E3/UL (ref 4–11)

## 2023-07-09 PROCEDURE — 96375 TX/PRO/DX INJ NEW DRUG ADDON: CPT

## 2023-07-09 PROCEDURE — 36415 COLL VENOUS BLD VENIPUNCTURE: CPT | Performed by: EMERGENCY MEDICINE

## 2023-07-09 PROCEDURE — 80053 COMPREHEN METABOLIC PANEL: CPT | Performed by: EMERGENCY MEDICINE

## 2023-07-09 PROCEDURE — 76705 ECHO EXAM OF ABDOMEN: CPT

## 2023-07-09 PROCEDURE — 85025 COMPLETE CBC W/AUTO DIFF WBC: CPT | Performed by: EMERGENCY MEDICINE

## 2023-07-09 PROCEDURE — 250N000011 HC RX IP 250 OP 636: Mod: JZ | Performed by: EMERGENCY MEDICINE

## 2023-07-09 PROCEDURE — 83690 ASSAY OF LIPASE: CPT | Performed by: EMERGENCY MEDICINE

## 2023-07-09 PROCEDURE — 96374 THER/PROPH/DIAG INJ IV PUSH: CPT

## 2023-07-09 PROCEDURE — 99285 EMERGENCY DEPT VISIT HI MDM: CPT | Mod: 25

## 2023-07-09 RX ORDER — ONDANSETRON 2 MG/ML
4 INJECTION INTRAMUSCULAR; INTRAVENOUS
Status: DISCONTINUED | OUTPATIENT
Start: 2023-07-09 | End: 2023-07-09 | Stop reason: HOSPADM

## 2023-07-09 RX ORDER — HYDROCODONE BITARTRATE AND ACETAMINOPHEN 5; 325 MG/1; MG/1
1 TABLET ORAL EVERY 6 HOURS PRN
Qty: 7 TABLET | Refills: 0 | Status: SHIPPED | OUTPATIENT
Start: 2023-07-09 | End: 2023-07-12

## 2023-07-09 RX ORDER — KETOROLAC TROMETHAMINE 15 MG/ML
15 INJECTION, SOLUTION INTRAMUSCULAR; INTRAVENOUS ONCE
Status: COMPLETED | OUTPATIENT
Start: 2023-07-09 | End: 2023-07-09

## 2023-07-09 RX ADMIN — ONDANSETRON 4 MG: 2 INJECTION INTRAMUSCULAR; INTRAVENOUS at 10:49

## 2023-07-09 RX ADMIN — KETOROLAC TROMETHAMINE 15 MG: 15 INJECTION, SOLUTION INTRAMUSCULAR; INTRAVENOUS at 10:49

## 2023-07-09 ASSESSMENT — ACTIVITIES OF DAILY LIVING (ADL): ADLS_ACUITY_SCORE: 35

## 2023-07-09 NOTE — ED PROVIDER NOTES
History     Chief Complaint:  Abdominal Pain       HPI   Ketty Watkins is a 62 year old female with known gallstones and history of biliary colic presents to the ER for evaluation of postprandial right upper quadrant abdominal pain.  Patient reports that she had cereal this morning.  1.5 hours after, she started feeling right upper quadrant pressure that was worsening.  She reports this is reminiscent from her last known gallbladder attacks in 2019.  She has since seen a surgeon and she has elected for watchful waiting rather than elective cholecystectomy.  She denies fever, nausea/vomiting, chest pain, shortness of breath, lower abdominal pain, urinary symptoms, bloody/black stools.  She does report mild constipation but attributes this to recent poor diet as her kitchen is under remodeling and she has been eating microwaved foods.  She is still able to pass gas and denies prior abdominal surgeries.  She reports her symptoms have improved to pain 3/10 since being in the ER.  She denies take any medications prior to arrival.      Independent Historian:   None - Patient Only    Review of External Notes:   None       Medications:    HYDROcodone-acetaminophen (NORCO) 5-325 MG tablet  azithromycin (ZITHROMAX) 250 MG tablet  cetirizine-psuedoePHEDrine (ZYRTEC-D) 5-120 MG per 12 hr tablet  multivitamin w/minerals (THERA-VIT-M) tablet  Omega-3 Fatty Acids (FISH OIL OMEGA-3 PO)        Past Medical History:    Past Medical History:   Diagnosis Date     Colon polyps        Past Surgical History:    Past Surgical History:   Procedure Laterality Date     COLONOSCOPY       COLONOSCOPY Left 11/28/2017    Procedure: COLONOSCOPY;  Colonoscopy ;  Surgeon: Michelle Fisher MD;  Location:  GI        Physical Exam     Patient Vitals for the past 24 hrs:   BP Temp Temp src Pulse Resp SpO2   07/09/23 1207 (!) 168/105 -- -- 82 -- 95 %   07/09/23 1043 (!) 157/103 -- -- -- -- 95 %   07/09/23 1025 (!) 191/111 -- -- -- -- 94 %    07/09/23 1015 (!) 158/90 -- -- 86 -- 96 %   07/09/23 1012 (!) 171/94 97.8  F (36.6  C) Oral 86 20 96 %        Physical Exam  General: Alert, no acute distress; well appearing  HEENT:  Moist mucous membranes. Conjunctiva normal.   CV:  RRR, no m/r/g, skin warm and well perfused  Pulm:  CTAB, no wheezes/ronchi/rales.  No acute distress, breathing comfortably  GI:  Soft, reproducible mild epigastric/RUQ tenderness, nondistended.    MSK:  Moving all extremities.  No focal areas of edema, erythema, or tenderness  Skin:  WWP, no rashes, no lower extremity edema, skin color normal, no diaphoresis    Emergency Department Course     Imaging:  US Abdomen Limited   Preliminary Result   IMPRESSION:   1.  Cholelithiasis. Negative sonographic Chan's sign. No biliary ductal dilatation.   2.  Fatty liver.            Report per radiology    Laboratory:  Labs Ordered and Resulted from Time of ED Arrival to Time of ED Departure   COMPREHENSIVE METABOLIC PANEL - Abnormal       Result Value    Sodium 141      Potassium 3.9      Chloride 104      Carbon Dioxide (CO2) 28      Anion Gap 9      Urea Nitrogen 12.3      Creatinine 0.72      Calcium 9.3      Glucose 107 (*)     Alkaline Phosphatase 132 (*)     AST 51 (*)     ALT 29      Protein Total 7.0      Albumin 4.2      Bilirubin Total 0.5      GFR Estimate >90     CBC WITH PLATELETS AND DIFFERENTIAL - Abnormal    WBC Count 6.5      RBC Count 5.14      Hemoglobin 15.6      Hematocrit 47.5 (*)     MCV 92      MCH 30.4      MCHC 32.8      RDW 12.1      Platelet Count 289      % Neutrophils 57      % Lymphocytes 33      % Monocytes 6      % Eosinophils 2      % Basophils 1      % Immature Granulocytes 1      NRBCs per 100 WBC 0      Absolute Neutrophils 3.8      Absolute Lymphocytes 2.1      Absolute Monocytes 0.4      Absolute Eosinophils 0.1      Absolute Basophils 0.0      Absolute Immature Granulocytes 0.0      Absolute NRBCs 0.0     LIPASE - Normal    Lipase 20          Procedures    None    Emergency Department Course & Assessments:      Interventions:  Medications   ondansetron (ZOFRAN) injection 4 mg (4 mg Intravenous $Given 23 1049)   ketorolac (TORADOL) injection 15 mg (15 mg Intravenous $Given 23 104)            Independent Interpretation (X-rays, CTs, rhythm strip):  None    Consultations/Discussion of Management or Tests:  None        Social Determinants of Health affecting care:   None    Disposition:  The patient was discharged to home.     Impression & Plan      Medical Decision Makin-year-old female with history of known gallstones with prior history of biliary colic presenting to the ER for evaluation of postprandial right upper quadrant abdominal pain reminiscent of her prior bouts of biliary colic.  Please eval for details of HPI and exam.  Patient is afebrile, noted to be hypertensive but otherwise vitally stable.  She is otherwise well-appearing.  She has reproducible mild epigastric/RUQ tenderness.  She has no lower abdominal tenderness to suggest appendicitis, diverticulitis or any physical exam findings suggestive of peritonitis/perforated viscus or other intra-abdominal catastrophe requiring CT imaging.  Broad differential was considered including but is not limited to biliary colic, cholecystitis, hepatitis, pancreatitis, GERD, PUD, gastritis, gastroenteritis amongst other things.  Lab studies above are largely unremarkable.  Slight elevation in AST, and alk phos, otherwise no other lab findings to suggest biliary obstruction.  Right upper quadrant ultrasound again notes cholelithiasis without evidence of cholecystitis, no evidence of biliary ductal dilatation.  Additional findings of hepatic steatosis also discussed with the patient.  Her pain completely improved while in the emergency department.  Overall suspect biliary colic as cause for her presentation.  She is overall safe to discharge home.  She is comfortable with this plan will follow-up closely  with general surgery as outpatient to discuss elective cholecystectomy.  Recommend use of Tylenol/ibuprofen as needed for recurrence of pain and will provide short course of pain medication for breakthrough/severe pain.  Opioid discussion at bedside.  Discussed return precautions for the emergency department.  All questions were answered prior to  discharge      Diagnosis:    ICD-10-CM    1. Biliary colic  K80.50            Discharge Medications:  Discharge Medication List as of 7/9/2023 12:03 PM      START taking these medications    Details   HYDROcodone-acetaminophen (NORCO) 5-325 MG tablet Take 1 tablet by mouth every 6 hours as needed for moderate to severe pain, Disp-7 tablet, R-0, Local Print                Indra Roberts MD  7/9/2023   Indra Roberts MD [place], Edgar Ronald, MD  07/09/23 6525

## 2023-07-09 NOTE — DISCHARGE INSTRUCTIONS

## 2023-07-09 NOTE — ED TRIAGE NOTES
Pt arrives ambulatory with daughter for upper right abdominal pain/pressure that began around 30 minutes ago. Was seen for this twice before and told she may have gallstones. Reports this feels the same as last episodes but it has been over two years. Did not take any medications prior to arrival. Ate special K cereal around 7:30a.

## 2024-01-25 ENCOUNTER — VIRTUAL VISIT (OUTPATIENT)
Dept: FAMILY MEDICINE | Facility: CLINIC | Age: 64
End: 2024-01-25
Payer: COMMERCIAL

## 2024-01-25 DIAGNOSIS — J01.90 ACUTE NON-RECURRENT SINUSITIS, UNSPECIFIED LOCATION: Primary | ICD-10-CM

## 2024-01-25 PROCEDURE — 99213 OFFICE O/P EST LOW 20 MIN: CPT | Mod: 95 | Performed by: NURSE PRACTITIONER

## 2024-01-25 ASSESSMENT — ENCOUNTER SYMPTOMS
FEVER: 0
SINUS PRESSURE: 1
COUGH: 1
CHILLS: 0
WHEEZING: 0
CHEST TIGHTNESS: 0
SHORTNESS OF BREATH: 0
GASTROINTESTINAL NEGATIVE: 1
SINUS PAIN: 1
MUSCULOSKELETAL NEGATIVE: 1

## 2024-01-25 NOTE — PROGRESS NOTES
Assessment & Plan     ICD-10-CM    1. Visit for screening mammogram  Z12.31       2. Screen for colon cancer  Z12.11         There are no Patient Instructions on file for this visit.      MARIA ESTHER Yancey CNP  [facility] [place]  ============================================    Subjective   Ketty is a 63 year old, presenting for the following health issues:  Sinus Problem        1/25/2024    11:19 AM   Additional Questions   Roomed by Stephanie ANDERSON Ma   Accompanied by self         1/25/2024    11:19 AM   Patient Reported Additional Medications   Patient reports taking the following new medications none     History of Present Illness       Reason for visit:  Sinus infection  Symptom onset:  3-7 days ago  Symptoms include:  Headache, green discharge  Symptom intensity:  Mild  Symptom progression:  Staying the same  Had these symptoms before:  Yes  Has tried/received treatment for these symptoms:  Yes  Previous treatment was successful:  Yes  Prior treatment description:  Zpack    She eats 2-3 servings of fruits and vegetables daily.She consumes 1 sweetened beverage(s) daily.She exercises with enough effort to increase her heart rate 9 or less minutes per day.  She exercises with enough effort to increase her heart rate 3 or less days per week.      Has had symptoms since Christmas and has worsened in the last 3-7 days.  Used z pack in the distant past in 2021. No other systemic symptoms and no lower respiratory symptoms.    Acute Illness  Acute illness concerns: sinus infection   Onset/Duration: 7 days   Symptoms:  Fever: No  Chills/Sweats: No  Headache (location?): No  Sinus Pressure: YES  Conjunctivitis:  No  Ear Pain: no  Rhinorrhea: No  Congestion: YES  Sore Throat: No  Cough: YES-productive of green sputum  Wheeze: No  Decreased Appetite: YES  Nausea: No  Vomiting: No  Diarrhea: No  Dysuria/Freq.: No  Dysuria or Hematuria: No  Fatigue/Achiness: YES  Sick/Strep Exposure: No  Therapies tried and  outcome: zte     Review of Systems   Constitutional:  Negative for chills and fever.   HENT:  Positive for congestion, postnasal drip, sinus pressure and sinus pain.    Respiratory:  Positive for cough. Negative for chest tightness, shortness of breath and wheezing.    Cardiovascular:  Negative for chest pain.   Gastrointestinal: Negative.    Musculoskeletal: Negative.      ============================================    Objective    Vitals:  No vitals were obtained today due to virtual visit.    Physical Exam:   General:  Health, alert and age appropriate activity  EYES: Eyes grossly normal to inspection.  No discharge or erythema, or obvious scleral/conjunctival abnormalities.  RESP: No audible wheeze, cough, or visible cyanosis.  No visible retractions or increased work of breathing.    SKIN: Visible skin clear. No significant rash, abnormal pigmentation or lesions.  PSYCH: Age-appropriate alertness and orientation    ============================================    Video-Visit Details  Ketty is a 63 year old  who is being evaluated via a billable video visit.    How would you like to obtain your AVS? MyChart  If the video visit is dropped, the invitation should be resent by: Text to cell phone: [phone]  Will anyone else be joining your video visit? no  If patient encounters technical issues they should call [phone] :  Originating Location (pt. Location): Home  Distant Location (provider location):  On-site  Platform used for Video Visit: phone to finish visit.  Had visualization over video but no audio.

## 2024-03-03 ENCOUNTER — OFFICE VISIT (OUTPATIENT)
Dept: URGENT CARE | Facility: URGENT CARE | Age: 64
End: 2024-03-03
Payer: COMMERCIAL

## 2024-03-03 VITALS
BODY MASS INDEX: 42.32 KG/M2 | SYSTOLIC BLOOD PRESSURE: 185 MMHG | DIASTOLIC BLOOD PRESSURE: 104 MMHG | HEART RATE: 90 BPM | TEMPERATURE: 97.2 F | WEIGHT: 224 LBS | OXYGEN SATURATION: 96 %

## 2024-03-03 DIAGNOSIS — J01.01 ACUTE RECURRENT MAXILLARY SINUSITIS: Primary | ICD-10-CM

## 2024-03-03 PROCEDURE — 99213 OFFICE O/P EST LOW 20 MIN: CPT

## 2024-03-03 RX ORDER — DOXYCYCLINE 100 MG/1
100 CAPSULE ORAL 2 TIMES DAILY
Qty: 14 CAPSULE | Refills: 0 | Status: SHIPPED | OUTPATIENT
Start: 2024-03-03 | End: 2024-04-04

## 2024-03-03 NOTE — PROGRESS NOTES
Assessment & Plan     Acute recurrent maxillary sinusitis  Patient presents with symptoms consistent with sinusitis. She was recently treated for a sinusitis with Augmentin in January, and she doesn't think she had much improvement with that treatment. She has also taken a Zpak in the past for sinusitis. Because she has failed other antibiotic treatments, we will treat with some doxycycline.   - doxycycline hyclate (VIBRAMYCIN) 100 MG capsule  Dispense: 14 capsule; Refill: 0  - Use Flonase 2 puffs in each nostril every morning     Colt Rivera [facility] Hdez is a 63 year old female who presents to clinic today for the following health issues:  Chief Complaint   Patient presents with    Jaw Pain     Left jaw pain x 2 day     Sinus Problem     Plowing nose  with green snot      Patient presents with left-sided upper jaw pain that has been present since Friday. She notes some green discharge when blowing her nose. Reports some aching in her left upper teeth along with a headache. Afebrile.      Review of Systems   Constitutional:  Negative for fever.   HENT:  Positive for congestion, dental problem, sinus pressure and sinus pain.    Respiratory:  Negative for cough and shortness of breath.    All other systems reviewed and are negative.        Objective    BP (!) 185/104 (BP Location: Right arm, Patient Position: Chair, Cuff Size: Adult Large)   Pulse 90   Temp 97.2  F (36.2  C) (Tympanic)   Wt 101.6 kg (224 lb)   LMP  (LMP Unknown)   SpO2 96%   BMI 42.32 kg/m    Physical Exam  Vitals reviewed.   Constitutional:       General: She is not in acute distress.     Appearance: Normal appearance. She is not ill-appearing.   HENT:      Head: Normocephalic and atraumatic.      Comments: Pain with palpation of left maxillary sinus     Right Ear: External ear normal.      Left Ear: External ear normal.      Nose: Congestion present.      Mouth/Throat:      Mouth: Mucous  membranes are moist.      Pharynx: Oropharynx is clear.      Comments: No obvious swelling or abscess noted intraorally  Eyes:      Extraocular Movements: Extraocular movements intact.      Conjunctiva/sclera: Conjunctivae normal.   Pulmonary:      Effort: Pulmonary effort is normal. No respiratory distress.   Neurological:      General: No focal deficit present.      Mental Status: She is alert and oriented to person, place, and time. Mental status is at baseline.   Psychiatric:         Mood and Affect: Mood normal.         Behavior: Behavior normal.         Thought Content: Thought content normal.         Judgment: Judgment normal.

## 2024-03-03 NOTE — PATIENT INSTRUCTIONS
Thank you for coming in to see us today!    - Take the doxycycline twice a day for the next 7 days  - Use Flonase every morning 2 puffs in each nostril  - Stop taking your multivitamin while taking the doxycycline    Colt Rivera, DO

## 2024-03-05 ASSESSMENT — ENCOUNTER SYMPTOMS
SHORTNESS OF BREATH: 0
SINUS PRESSURE: 1
COUGH: 0
SINUS PAIN: 1
FEVER: 0

## 2024-03-28 SDOH — HEALTH STABILITY: PHYSICAL HEALTH: ON AVERAGE, HOW MANY DAYS PER WEEK DO YOU ENGAGE IN MODERATE TO STRENUOUS EXERCISE (LIKE A BRISK WALK)?: 0 DAYS

## 2024-03-28 SDOH — HEALTH STABILITY: PHYSICAL HEALTH: ON AVERAGE, HOW MANY MINUTES DO YOU ENGAGE IN EXERCISE AT THIS LEVEL?: PATIENT DECLINED

## 2024-03-28 ASSESSMENT — SOCIAL DETERMINANTS OF HEALTH (SDOH): HOW OFTEN DO YOU GET TOGETHER WITH FRIENDS OR RELATIVES?: ONCE A WEEK

## 2024-03-28 NOTE — COMMUNITY RESOURCES LIST (ENGLISH)
March 28, 2024           YOUR PERSONALIZED LIST OF SERVICES & PROGRAMS           & RECREATION    Sports      [facility] - Summer Sports Camp  [address] [place] [address] (Distance: 6.1 miles)  Phone: [phone]  Website: [email or web address]_[email or web address]  Language: English  Fee: Self pay      [facility] [place] - Sports clubs and recreational activities - [place] [facility]  [address] [place] [address] (Distance: 6.1 miles)  Language: English  Fee: Self pay, Sliding scale      [employer]ASE[employer] AND SOFTBALL  Website: [email or web address]    Classes/Groups      [employer] [employer] - Group fitness classes  [address] [place] [address] (Distance: 0.9 miles)  Phone: [phone]  Website: [email or web address]/projectexplore  Language: English  Fee: Self pay, Insurance  Accessibility: [facility] accessible, Translation services, Deaf or hard of hearing, Blind accommodation      Center - Yoga or Pilates classes  [address] [place] [address] (Distance: 1.4 miles)  Phone: [phone]  Website: http://[email or web address].us/index.aspx?VCD=563  Language: English  Fee: Self pay  Accessibility: [facility] - Care Coordination (Healthcare only)  Phone: [phone]  Website: [email or web address]  Language: English, Serbian  Hours: Wed 9:00 AM - 11:30 AM Thu 1:00 PM - 4:00 PM, 5:30 PM - 7:00 PM               IMPORTANT NUMBERS & WEBSITES        Emergency Services  911  .   United Way  211 http://211unitedway.org  .   Poison Control  (678) 924-9945 http://[place]poison.org http://[place]poison.org  .     Suicide and Crisis Lifeline  988 http://988lifeline.org  .   Childhelp National Child Abuse Hotline  809.966.2585 http://Childhelphotline.org   .   National Sexual Assault Hotline  (501) 527-7934 ([place]) http://[facility].org   .      National Runaway Safeline  (122) 435-8486 ([place]) http://[employer]Spye.org  .   Pregnancy & Postpartum Support  Call/text 500-062-1918  [place]: http://ppsupportmn.org  [place]: http://psichapters.com/wi  .   Substance Abuse National Helpline ([facility])  889-911-HELP (6815) http://Findtreatment.gov   .                DISCLAIMER: [employer] does not endorse any service providers mentioned in this resource list. [employer] does not guarantee that the services mentioned in this resource list will be available to you or will improve your health or wellness.    [facility]

## 2024-04-04 ENCOUNTER — OFFICE VISIT (OUTPATIENT)
Dept: FAMILY MEDICINE | Facility: CLINIC | Age: 64
End: 2024-04-04
Payer: COMMERCIAL

## 2024-04-04 VITALS
SYSTOLIC BLOOD PRESSURE: 158 MMHG | HEIGHT: 61 IN | TEMPERATURE: 97.9 F | HEART RATE: 81 BPM | RESPIRATION RATE: 16 BRPM | WEIGHT: 227.6 LBS | BODY MASS INDEX: 42.97 KG/M2 | OXYGEN SATURATION: 98 % | DIASTOLIC BLOOD PRESSURE: 94 MMHG

## 2024-04-04 DIAGNOSIS — G47.33 OBSTRUCTIVE SLEEP APNEA SYNDROME: ICD-10-CM

## 2024-04-04 DIAGNOSIS — Z12.4 CERVICAL CANCER SCREENING: ICD-10-CM

## 2024-04-04 DIAGNOSIS — Z00.00 ROUTINE GENERAL MEDICAL EXAMINATION AT A HEALTH CARE FACILITY: Primary | ICD-10-CM

## 2024-04-04 DIAGNOSIS — Z12.11 SCREEN FOR COLON CANCER: ICD-10-CM

## 2024-04-04 DIAGNOSIS — I10 BENIGN ESSENTIAL HYPERTENSION: ICD-10-CM

## 2024-04-04 DIAGNOSIS — Z11.4 SCREENING FOR HIV (HUMAN IMMUNODEFICIENCY VIRUS): ICD-10-CM

## 2024-04-04 DIAGNOSIS — E66.01 MORBID OBESITY (H): ICD-10-CM

## 2024-04-04 PROBLEM — E66.9 NON MORBID OBESITY, UNSPECIFIED OBESITY TYPE: Status: RESOLVED | Noted: 2017-07-03 | Resolved: 2024-04-04

## 2024-04-04 LAB
ALBUMIN SERPL BCG-MCNC: 4.4 G/DL (ref 3.5–5.2)
ALP SERPL-CCNC: 119 U/L (ref 40–150)
ALT SERPL W P-5'-P-CCNC: 18 U/L (ref 0–50)
ANION GAP SERPL CALCULATED.3IONS-SCNC: 12 MMOL/L (ref 7–15)
AST SERPL W P-5'-P-CCNC: 16 U/L (ref 0–45)
BILIRUB SERPL-MCNC: 0.5 MG/DL
BUN SERPL-MCNC: 12 MG/DL (ref 8–23)
CALCIUM SERPL-MCNC: 9.4 MG/DL (ref 8.8–10.2)
CHLORIDE SERPL-SCNC: 103 MMOL/L (ref 98–107)
CHOLEST SERPL-MCNC: 269 MG/DL
CREAT SERPL-MCNC: 0.65 MG/DL (ref 0.51–0.95)
DEPRECATED HCO3 PLAS-SCNC: 27 MMOL/L (ref 22–29)
EGFRCR SERPLBLD CKD-EPI 2021: >90 ML/MIN/1.73M2
ERYTHROCYTE [DISTWIDTH] IN BLOOD BY AUTOMATED COUNT: 12.5 % (ref 10–15)
GLUCOSE SERPL-MCNC: 100 MG/DL (ref 70–99)
HBA1C MFR BLD: 5.7 % (ref 0–5.6)
HCT VFR BLD AUTO: 49.1 % (ref 35–47)
HDLC SERPL-MCNC: 56 MG/DL
HGB BLD-MCNC: 15.6 G/DL (ref 11.7–15.7)
HIV 1+2 AB+HIV1 P24 AG SERPL QL IA: NONREACTIVE
LDLC SERPL CALC-MCNC: 179 MG/DL
MCH RBC QN AUTO: 29.7 PG (ref 26.5–33)
MCHC RBC AUTO-ENTMCNC: 31.8 G/DL (ref 31.5–36.5)
MCV RBC AUTO: 93 FL (ref 78–100)
NONHDLC SERPL-MCNC: 213 MG/DL
PLATELET # BLD AUTO: 260 10E3/UL (ref 150–450)
POTASSIUM SERPL-SCNC: 3.9 MMOL/L (ref 3.4–5.3)
PROT SERPL-MCNC: 7.2 G/DL (ref 6.4–8.3)
RBC # BLD AUTO: 5.26 10E6/UL (ref 3.8–5.2)
SODIUM SERPL-SCNC: 142 MMOL/L (ref 135–145)
TRIGL SERPL-MCNC: 170 MG/DL
TSH SERPL DL<=0.005 MIU/L-ACNC: 0.9 UIU/ML (ref 0.3–4.2)
WBC # BLD AUTO: 5.9 10E3/UL (ref 4–11)

## 2024-04-04 PROCEDURE — 36415 COLL VENOUS BLD VENIPUNCTURE: CPT | Performed by: NURSE PRACTITIONER

## 2024-04-04 PROCEDURE — 87624 HPV HI-RISK TYP POOLED RSLT: CPT | Performed by: NURSE PRACTITIONER

## 2024-04-04 PROCEDURE — 99396 PREV VISIT EST AGE 40-64: CPT | Mod: 25 | Performed by: NURSE PRACTITIONER

## 2024-04-04 PROCEDURE — G0145 SCR C/V CYTO,THINLAYER,RESCR: HCPCS | Performed by: NURSE PRACTITIONER

## 2024-04-04 PROCEDURE — 84443 ASSAY THYROID STIM HORMONE: CPT | Performed by: NURSE PRACTITIONER

## 2024-04-04 PROCEDURE — 85027 COMPLETE CBC AUTOMATED: CPT | Performed by: NURSE PRACTITIONER

## 2024-04-04 PROCEDURE — 83036 HEMOGLOBIN GLYCOSYLATED A1C: CPT | Performed by: NURSE PRACTITIONER

## 2024-04-04 PROCEDURE — 87389 HIV-1 AG W/HIV-1&-2 AB AG IA: CPT | Performed by: NURSE PRACTITIONER

## 2024-04-04 PROCEDURE — 80053 COMPREHEN METABOLIC PANEL: CPT | Performed by: NURSE PRACTITIONER

## 2024-04-04 PROCEDURE — 80061 LIPID PANEL: CPT | Performed by: NURSE PRACTITIONER

## 2024-04-04 PROCEDURE — 99214 OFFICE O/P EST MOD 30 MIN: CPT | Mod: 25 | Performed by: NURSE PRACTITIONER

## 2024-04-04 RX ORDER — HYDROCHLOROTHIAZIDE 12.5 MG/1
12.5 TABLET ORAL DAILY
Qty: 30 TABLET | Refills: 2 | Status: SHIPPED | OUTPATIENT
Start: 2024-04-04 | End: 2024-07-03

## 2024-04-04 ASSESSMENT — ENCOUNTER SYMPTOMS
CONSTIPATION: 0
FEVER: 0
ABDOMINAL PAIN: 0
CHILLS: 0
CHEST TIGHTNESS: 0
DYSURIA: 0
PALPITATIONS: 0
SHORTNESS OF BREATH: 0
DIARRHEA: 0
UNEXPECTED WEIGHT CHANGE: 0

## 2024-04-04 NOTE — PROGRESS NOTES
Assessment & Plan     ICD-10-CM    1. Routine general medical examination at a health care facility  Z00.00       2. Screening for HIV (human immunodeficiency virus)  Z11.4 HIV Antigen Antibody Combo     HIV Antigen Antibody Combo      3. Screen for colon cancer  Z12.11 Colonoscopy Screening  Referral      4. Cervical cancer screening  Z12.4 Pap Screen with HPV - recommended age 30 - 65 years      5. Obstructive sleep apnea syndrome  G47.33 Has upcoming appt with sleep medicine      6. Benign essential hypertension    Poorly controlled.  Add hydrochlorathiazide and follow up in one month.  Labs as noted for obesity and HTN I10 Comprehensive metabolic panel     Hemoglobin A1c     TSH with free T4 reflex     CBC with platelets     hydroCHLOROthiazide 12.5 MG tablet     Comprehensive metabolic panel     Hemoglobin A1c     TSH with free T4 reflex     CBC with platelets  Lipid            MARIA ESTHER Yancey CNP  [facility] [place]  ============================================  Subjective   Ketty Watkins is a 63 year old female   here for a Physical Exam      Obstructive sleep apnea syndrome  Upcoming appointment with sleep medicine    Morbid obesity (H)  Weight gain noted by patient.  BMI is 43.  Labs today and Follow-up as indicated      Health Care Directive    Patient Care Team:  Pamela Gregory APRN CNP as PCP - General (Family Practice)  Pamela Gregory APRN CNP as Assigned PCP        3/28/2024   General Health   How would you rate your overall physical health? (!) FAIR   Feel stress (tense, anxious, or unable to sleep) Not at all         3/28/2024   Nutrition   Three or more servings of calcium each day? (!) NO   Diet: Regular (no restrictions)   How many servings of fruit and vegetables per day? (!) 2-3   How many sweetened beverages each day? 0-1         3/28/2024   Exercise   Days per week of moderate/strenous exercise 0 days   Average minutes spent exercising at this level  Patient declined   (!) EXERCISE CONCERN        3/28/2024   Social Factors   Frequency of gathering with friends or relatives Once a week   Worry food won't last until get money to buy more No   Food not last or not have enough money for food? No   Do you have housing?  Yes   Are you worried about losing your housing? No   Lack of transportation? No   Unable to get utilities (heat,electricity)? No         3/28/2024   Fall Risk   Fallen 2 or more times in the past year? No   Trouble with walking or balance? No          3/28/2024   Dental   Dentist two times every year? Yes         3/28/2024   TB Screening   Were you born outside of the [place]? No           3/28/2024   Substance Use   Alcohol more than 3/day or more than 7/wk No   Do you use any other substances recreationally? No       Social History     Tobacco Use    Smoking status: Never     Passive exposure: Never    Smokeless tobacco: Never   Vaping Use    Vaping Use: Never used   Substance Use Topics    Alcohol use: Yes     Comment: rarely    Drug use: No           3/28/2024   Breast Cancer Screening   Family history of breast, colon, or ovarian cancer? Yes         3/28/2024   LAST FHS-7 RESULTS   1st degree relative breast or ovarian cancer No   Any relative bilateral breast cancer No   Any male have breast cancer No   Any ONE woman have BOTH breast AND ovarian cancer No   Any woman with breast cancer before 50yrs No   2 or more relatives with breast AND/OR ovarian cancer No   2 or more relatives with breast AND/OR bowel cancer No             3/28/2024   One time HIV Screening   Previous HIV test? I don't know         3/28/2024   STI Screening   New sexual partner(s) since last STI/HIV test? No     History of abnormal Pap smear:         Latest Ref Rng & Units 11/12/2020     3:53 PM 11/12/2020     3:40 PM 7/3/2017     8:45 AM   PAP / HPV   PAP (Historical)  NIL   NIL    HPV 16 DNA NEG^Negative  Negative  Negative    HPV 18 DNA NEG^Negative  Negative  Negative     Other HR HPV NEG^Negative  Negative  Negative      ASCVD Risk   The 10-year ASCVD risk score (Zaira VARGHESE, et al., 2019) is: 10.2%    Values used to calculate the score:      Age: 63 years      Sex: Female      Is Non- : No      Diabetic: No      Tobacco smoker: No      Systolic Blood Pressure: 158 mmHg      Is BP treated: Yes      HDL Cholesterol: 44 mg/dL      Total Cholesterol: 260 mg/dL    Plan eval of labs and further follow up       The Following Health Maintenance Topics are reviewed and are correct as of today:  Health Maintenance   Topic Date Due    HIV SCREENING  Never done    ZOSTER IMMUNIZATION (1 of 2) Never done    MAMMO SCREENING  10/09/2022    COLORECTAL CANCER SCREENING  11/28/2022    HPV TEST  11/12/2023    PAP  11/12/2023    INFLUENZA VACCINE (1) 06/30/2024 (Originally 9/1/2023)    COVID-19 Vaccine (3 - 2023-24 season) 04/01/2025 (Originally 9/1/2023)    RSV VACCINE (Pregnancy & 60+) (1 - 1-dose 60+ series) 04/01/2027 (Originally 10/9/2022)    YEARLY PREVENTIVE VISIT  04/04/2025    ANNUAL REVIEW OF HM ORDERS  04/04/2025    DTAP/TDAP/TD IMMUNIZATION (2 - Td or Tdap) 10/02/2025    LIPID  11/10/2025    ADVANCE CARE PLANNING  11/23/2025    GLUCOSE  07/09/2026    HEPATITIS C SCREENING  Completed    PHQ-2 (once per calendar year)  Completed    Pneumococcal Vaccine: Pediatrics (0 to 5 Years) and At-Risk Patients (6 to 64 Years)  Aged Out    IPV IMMUNIZATION  Aged Out    HPV IMMUNIZATION  Aged Out    MENINGITIS IMMUNIZATION  Aged Out    RSV MONOCLONAL ANTIBODY  Aged Out     Reviewed and updated as needed this visit by Provider   Tobacco  Allergies  Meds  Problems  Med Hx  Surg Hx  Fam Hx           HPI  Review of Systems   Constitutional:  Negative for chills, fever and unexpected weight change.   HENT: Negative.     Respiratory:  Negative for chest tightness and shortness of breath.    Cardiovascular:  Negative for chest pain and palpitations.   Gastrointestinal:   "Negative for abdominal pain, constipation and diarrhea.   Genitourinary:  Negative for dysuria.   Skin:  Negative for rash.     ============================================    Objective    Exam  BP (!) 158/94 (BP Location: Right arm, Patient Position: Sitting, Cuff Size: Adult Large)   Pulse 81   Temp 97.9  F (36.6  C) (Oral)   Resp 16   Ht 1.549 m (5' 1\")   Wt 103.2 kg (227 lb 9.6 oz)   LMP  (LMP Unknown)   SpO2 98%   BMI 43.00 kg/m    Physical Exam  Constitutional:       Appearance: Normal appearance.   HENT:      Right Ear: Tympanic membrane normal.      Left Ear: Tympanic membrane normal.      Nose: Nose normal.      Mouth/Throat:      Mouth: Mucous membranes are moist.      Pharynx: Oropharynx is clear.   Eyes:      Conjunctiva/sclera: Conjunctivae normal.   Cardiovascular:      Rate and Rhythm: Normal rate and regular rhythm.      Heart sounds: Normal heart sounds.   Pulmonary:      Effort: Pulmonary effort is normal.      Breath sounds: Normal breath sounds.   Abdominal:      General: Abdomen is flat. Bowel sounds are normal.      Palpations: Abdomen is soft.      Tenderness: There is no abdominal tenderness.   Genitourinary:     Comments: Vagina and vulva are normal;  no discharge is noted.  Cervix normal without lesions. Uterus anteverted and mobile,  without tenderness.  Adnexa  without masses or tenderness.   Musculoskeletal:      Cervical back: Neck supple.      Right lower leg: No edema.      Left lower leg: No edema.   Skin:     General: Skin is warm and dry.      Findings: No rash.   Neurological:      Mental Status: She is alert.   Psychiatric:         Mood and Affect: Mood normal.           Signed Electronically by: MARIA ESTHER Yancey CNP  "

## 2024-04-04 NOTE — PATIENT INSTRUCTIONS
Please see me in clinic again in 4-6 weeks.  We will also discuss your labs then.    Preventive Care Advice   This is general advice given by our system to help you stay healthy. However, your care team may have specific advice just for you. Please talk to your care team about your preventive care needs.  Nutrition  Eat 5 or more servings of fruits and vegetables each day.  Try wheat bread, brown rice and whole grain pasta (instead of white bread, rice, and pasta).  Get enough calcium and vitamin D. Check the label on foods and aim for 100% of the RDA (recommended daily allowance).  Lifestyle  Exercise at least 150 minutes each week   (30 minutes a day, 5 days a week).  Do muscle strengthening activities 2 days a week. These help control your weight and prevent disease.  No smoking.  Wear sunscreen to prevent skin cancer.  Have a dental exam and cleaning every 6 months.  Yearly exams  See your health care team every year to talk about:  Any changes in your health.  Any medicines your care team has prescribed.  Preventive care, family planning, and ways to prevent chronic diseases.  Shots (vaccines)   HPV shots (up to age 26), if you've never had them before.  Hepatitis B shots (up to age 59), if you've never had them before.  COVID-19 shot: Get this shot when it's due.  Flu shot: Get a flu shot every year.  Tetanus shot: Get a tetanus shot every 10 years.  Pneumococcal, hepatitis A, and RSV shots: Ask your care team if you need these based on your risk.  Shingles shot (for age 50 and up).  General health tests  Diabetes screening:  Starting at age 35, Get screened for diabetes at least every 3 years.  If you are younger than age 35, ask your care team if you should be screened for diabetes.  Cholesterol test: At age 39, start having a cholesterol test every 5 years, or more often if advised.  Bone density scan (DEXA): At age 50, ask your care team if you should have this scan for osteoporosis (brittle  bones).  Hepatitis C: Get tested at least once in your life.  STIs (sexually transmitted infections)  Before age 24: Ask your care team if you should be screened for STIs.  After age 24: Get screened for STIs if you're at risk. You are at risk for STIs (including HIV) if:  You are sexually active with more than one person.  You don't use condoms every time.  You or a partner was diagnosed with a sexually transmitted infection.  If you are at risk for HIV, ask about PrEP medicine to prevent HIV.  Get tested for HIV at least once in your life, whether you are at risk for HIV or not.  Cancer screening tests  Cervical cancer screening: If you have a cervix, begin getting regular cervical cancer screening tests at age 21. Most people who have regular screenings with normal results can stop after age 65. Talk about this with your provider.  Breast cancer scan (mammogram): If you've ever had breasts, begin having regular mammograms starting at age 40. This is a scan to check for breast cancer.  Colon cancer screening: It is important to start screening for colon cancer at age 45.  Have a colonoscopy test every 10 years (or more often if you're at risk) Or, ask your provider about stool tests like a FIT test every year or Cologuard test every 3 years.  To learn more about your testing options, visit: https://www.[employer]/919356.pdf.  For help making a decision, visit: https://bit.ly/az43071.  Prostate cancer screening test: If you have a prostate and are age 55 to 69, ask your provider if you would benefit from a yearly prostate cancer screening test.  Lung cancer screening: If you are a current or former smoker age 50 to 80, ask your care team if ongoing lung cancer screenings are right for you.  For informational purposes only. Not to replace the advice of your health care provider. Copyright   2023 [employer]. All rights reserved. Clinically reviewed by the [facility].  [employer] 397839 - REV 01/24.

## 2024-04-08 LAB
BKR LAB AP GYN ADEQUACY: NORMAL
BKR LAB AP GYN INTERPRETATION: NORMAL
BKR LAB AP HPV REFLEX: NORMAL
BKR LAB AP PREVIOUS ABNORMAL: NORMAL
PATH REPORT.COMMENTS IMP SPEC: NORMAL
PATH REPORT.COMMENTS IMP SPEC: NORMAL
PATH REPORT.RELEVANT HX SPEC: NORMAL

## 2024-04-09 LAB
HUMAN PAPILLOMA VIRUS 16 DNA: NEGATIVE
HUMAN PAPILLOMA VIRUS 18 DNA: NEGATIVE
HUMAN PAPILLOMA VIRUS FINAL DIAGNOSIS: NORMAL
HUMAN PAPILLOMA VIRUS OTHER HR: NEGATIVE

## 2024-04-19 ENCOUNTER — ANCILLARY PROCEDURE (OUTPATIENT)
Dept: MAMMOGRAPHY | Facility: CLINIC | Age: 64
End: 2024-04-19
Attending: INTERNAL MEDICINE
Payer: COMMERCIAL

## 2024-04-19 DIAGNOSIS — Z12.31 VISIT FOR SCREENING MAMMOGRAM: ICD-10-CM

## 2024-04-19 PROCEDURE — 77067 SCR MAMMO BI INCL CAD: CPT | Mod: TC | Performed by: RADIOLOGY

## 2024-04-19 PROCEDURE — 77063 BREAST TOMOSYNTHESIS BI: CPT | Mod: TC | Performed by: RADIOLOGY

## 2024-04-22 ENCOUNTER — TRANSFERRED RECORDS (OUTPATIENT)
Dept: HEALTH INFORMATION MANAGEMENT | Facility: CLINIC | Age: 64
End: 2024-04-22
Payer: COMMERCIAL

## 2024-05-02 ENCOUNTER — OFFICE VISIT (OUTPATIENT)
Dept: FAMILY MEDICINE | Facility: CLINIC | Age: 64
End: 2024-05-02
Payer: COMMERCIAL

## 2024-05-02 VITALS
HEIGHT: 61 IN | TEMPERATURE: 98.2 F | WEIGHT: 225.8 LBS | SYSTOLIC BLOOD PRESSURE: 163 MMHG | HEART RATE: 81 BPM | DIASTOLIC BLOOD PRESSURE: 87 MMHG | OXYGEN SATURATION: 97 % | RESPIRATION RATE: 18 BRPM | BODY MASS INDEX: 42.63 KG/M2

## 2024-05-02 DIAGNOSIS — I10 BENIGN ESSENTIAL HYPERTENSION: Primary | ICD-10-CM

## 2024-05-02 DIAGNOSIS — E78.2 MIXED HYPERLIPIDEMIA: ICD-10-CM

## 2024-05-02 PROCEDURE — 99214 OFFICE O/P EST MOD 30 MIN: CPT | Performed by: NURSE PRACTITIONER

## 2024-05-02 RX ORDER — ROSUVASTATIN CALCIUM 5 MG/1
5 TABLET, COATED ORAL DAILY
Qty: 30 TABLET | Refills: 3 | Status: SHIPPED | OUTPATIENT
Start: 2024-05-02 | End: 2024-08-21

## 2024-05-02 RX ORDER — LISINOPRIL/HYDROCHLOROTHIAZIDE 10-12.5 MG
1 TABLET ORAL DAILY
Qty: 30 TABLET | Refills: 1 | Status: SHIPPED | OUTPATIENT
Start: 2024-05-02 | End: 2024-06-27

## 2024-05-02 ASSESSMENT — ENCOUNTER SYMPTOMS
CONSTIPATION: 0
CHILLS: 0
PALPITATIONS: 0
UNEXPECTED WEIGHT CHANGE: 0
DIARRHEA: 0
FEVER: 0
ABDOMINAL PAIN: 0
DYSURIA: 0
CHEST TIGHTNESS: 0
SHORTNESS OF BREATH: 0

## 2024-05-02 NOTE — PROGRESS NOTES
Assessment & Plan     ICD-10-CM    1. Benign essential hypertension    Add lisinopril  Return in about 8 weeks (around 6/27/2024) for In Clinic Follow Up.   I10 lisinopril-hydrochlorothiazide (ZESTORETIC) 10-12.5 MG tablet     Basic metabolic panel  (Ca, Cl, CO2, Creat, Gluc, K, Na, BUN)      2. Mixed hyperlipidemia    Add statin, repeat lipids as noted  Slow titration of statin, monitor for myalgias E78.2 rosuvastatin (CRESTOR) 5 MG tablet     Lipid Profile (Chol, Trig, HDL, LDL calc)          MARIA ESTHER Yancey CNP  [facility] [place]  ============================================  Subjective  Benign essential hypertension  Started on hydrochlorathiazide for elevated blood pressure.  Remains elevated.  The patient is taking hypertensive medications compliantly without side effects.  Denies chest pain, dyspnea, edema, or TIA's.  Discussed addition of medication and patient agrees.  Risks and benefits of lisinopril were reviewed and specifically, pt is aware of the risk of ACE inhibitor -related cough.      Mixed hyperlipidemia  Reviewed lipid panel and 10-year ASCVD risk at 9.6%:    The 10-year ASCVD risk score (Zaira VARGHESE, et al., 2019) is: 9.6%    Values used to calculate the score:      Age: 63 years      Sex: Female      Is Non- : No      Diabetic: No      Tobacco smoker: No      Systolic Blood Pressure: 163 mmHg      Is BP treated: Yes      HDL Cholesterol: 56 mg/dL      Total Cholesterol: 269 mg/dL    Discussed risks and benefits of statins. Patient is concerned about myalgias--as she has had them on gemfibrozil in the past..  We discussed this further and pt is willing to trial statin and report any side effects that might occur    History of Present Illness       Hypertension: She presents for follow up of hypertension.  She does not check blood pressure  regularly outside of the clinic. Outpatient blood pressures have not been over 140/90. She does not follow a low  salt diet.     She eats 2-3 servings of fruits and vegetables daily.She consumes 0 sweetened beverage(s) daily.She exercises with enough effort to increase her heart rate 10 to 19 minutes per day.  She exercises with enough effort to increase her heart rate 3 or less days per week.   She is taking medications regularly.    Review of Systems   Constitutional:  Negative for chills, fever and unexpected weight change.   HENT: Negative.     Respiratory:  Negative for chest tightness and shortness of breath.    Cardiovascular:  Negative for chest pain and palpitations.   Gastrointestinal:  Negative for abdominal pain, constipation and diarrhea.   Genitourinary:  Negative for dysuria.   Skin:  Negative for rash.       Component      Latest Ref [facility] 4/4/2024  9:41 AM   Sodium      135 - 145 mmol/L 142    Potassium      3.4 - 5.3 mmol/L 3.9    Carbon Dioxide (CO2)      22 - 29 mmol/L 27    Anion Gap      7 - 15 mmol/L 12    Urea Nitrogen      8.0 - 23.0 mg/dL 12.0    Creatinine      0.51 - 0.95 mg/dL 0.65    GFR Estimate      >60 mL/min/1.73m2 >90    Calcium      8.8 - 10.2 mg/dL 9.4    Chloride      98 - 107 mmol/L 103    Glucose      70 - 99 mg/dL 100 (H)    Alkaline Phosphatase      40 - 150 U/L 119    AST      0 - 45 U/L 16    ALT      0 - 50 U/L 18    Protein Total      6.4 - 8.3 g/dL 7.2    Albumin      3.5 - 5.2 g/dL 4.4    Bilirubin Total      <=1.2 mg/dL 0.5    WBC      4.0 - 11.0 10e3/uL 5.9    RBC Count      3.80 - 5.20 10e6/uL 5.26 (H)    Hemoglobin      11.7 - 15.7 g/dL 15.6    Hematocrit      35.0 - 47.0 % 49.1 (H)    MCV      78 - 100 fL 93    MCH      26.5 - 33.0 pg 29.7    MCHC      31.5 - 36.5 g/dL 31.8    RDW      10.0 - 15.0 % 12.5    Platelet Count      150 - 450 10e3/uL 260    Cholesterol      <200 mg/dL 269 (H)    Triglycerides      <150 mg/dL 170 (H)    HDL Cholesterol      >=50 mg/dL 56    LDL Cholesterol Calculated      <=100 mg/dL 179 (H)    Non HDL Cholesterol      <130 mg/dL 213 (H)    HIV  "Antigen Antibody Combo      Nonreactive  Nonreactive    Hemoglobin A1C      0.0 - 5.6 % 5.7 (H)    TSH      0.30 - 4.20 uIU/mL 0.90       Legend:  (H) High  ============================================  Objective    BP (!) 163/87 (BP Location: Right arm, Patient Position: Sitting, Cuff Size: Adult Large)   Pulse 81   Temp 98.2  F (36.8  C) (Oral)   Resp 18   Ht 1.549 m (5' 1\")   Wt 102.4 kg (225 lb 12.8 oz)   LMP  (LMP Unknown)   SpO2 97%   BMI 42.66 kg/m    Physical Exam  Constitutional:       Appearance: Normal appearance.   Eyes:      Conjunctiva/sclera: Conjunctivae normal.   Cardiovascular:      Rate and Rhythm: Normal rate and regular rhythm.      Heart sounds: Normal heart sounds.   Pulmonary:      Effort: Pulmonary effort is normal.      Breath sounds: Normal breath sounds.   Skin:     General: Skin is warm and dry.      Findings: No rash.   Neurological:      Mental Status: She is alert.   Psychiatric:         Mood and Affect: Mood normal.        ============================================  MARIA ESTHER Yancey CNP  Signed Electronically by: MARIA ESTHER Yancey CNP        "

## 2024-05-02 NOTE — ASSESSMENT & PLAN NOTE
Reviewed lipid panel and 10-year ASCVD risk at 9.6%:    The 10-year ASCVD risk score (Zaira VARGHESE, et al., 2019) is: 9.6%    Values used to calculate the score:      Age: 63 years      Sex: Female      Is Non- : No      Diabetic: No      Tobacco smoker: No      Systolic Blood Pressure: 163 mmHg      Is BP treated: Yes      HDL Cholesterol: 56 mg/dL      Total Cholesterol: 269 mg/dL    Discussed risks and benefits of statins. Patient is concerned about myalgias--as she has had them on gemfibrozil in the past..  We discussed this further and pt is willing to trial statin and report any side effects that might occur

## 2024-05-02 NOTE — ASSESSMENT & PLAN NOTE
Started on hydrochlorathiazide for elevated blood pressure.  Remains elevated.  The patient is taking hypertensive medications compliantly without side effects.  Denies chest pain, dyspnea, edema, or TIA's.  Discussed addition of medication and patient agrees.  Risks and benefits of lisinopril were reviewed and specifically, pt is aware of the risk of ACE inhibitor -related cough.

## 2024-05-21 ENCOUNTER — TRANSFERRED RECORDS (OUTPATIENT)
Dept: HEALTH INFORMATION MANAGEMENT | Facility: CLINIC | Age: 64
End: 2024-05-21
Payer: COMMERCIAL

## 2024-06-27 ENCOUNTER — MYC MEDICAL ADVICE (OUTPATIENT)
Dept: FAMILY MEDICINE | Facility: CLINIC | Age: 64
End: 2024-06-27
Payer: COMMERCIAL

## 2024-06-27 DIAGNOSIS — I10 BENIGN ESSENTIAL HYPERTENSION: ICD-10-CM

## 2024-06-27 RX ORDER — LISINOPRIL/HYDROCHLOROTHIAZIDE 10-12.5 MG
1 TABLET ORAL DAILY
Qty: 30 TABLET | Refills: 0 | OUTPATIENT
Start: 2024-06-27

## 2024-06-27 RX ORDER — LISINOPRIL/HYDROCHLOROTHIAZIDE 10-12.5 MG
1 TABLET ORAL DAILY
Qty: 30 TABLET | Refills: 0 | Status: SHIPPED | OUTPATIENT
Start: 2024-06-27 | End: 2024-07-03

## 2024-06-27 NOTE — TELEPHONE ENCOUNTER
LMTCB.   sent advising of prescription sent in.    Zenaida Vann RN, BSN  [facility]

## 2024-07-03 ENCOUNTER — OFFICE VISIT (OUTPATIENT)
Dept: FAMILY MEDICINE | Facility: CLINIC | Age: 64
End: 2024-07-03
Attending: NURSE PRACTITIONER
Payer: COMMERCIAL

## 2024-07-03 VITALS
DIASTOLIC BLOOD PRESSURE: 88 MMHG | OXYGEN SATURATION: 95 % | BODY MASS INDEX: 41.91 KG/M2 | HEART RATE: 80 BPM | HEIGHT: 61 IN | TEMPERATURE: 98.3 F | SYSTOLIC BLOOD PRESSURE: 131 MMHG | WEIGHT: 222 LBS | RESPIRATION RATE: 20 BRPM

## 2024-07-03 DIAGNOSIS — E78.2 MIXED HYPERLIPIDEMIA: Primary | ICD-10-CM

## 2024-07-03 DIAGNOSIS — I10 BENIGN ESSENTIAL HYPERTENSION: ICD-10-CM

## 2024-07-03 DIAGNOSIS — E66.01 MORBID OBESITY (H): ICD-10-CM

## 2024-07-03 PROBLEM — K64.9 HEMORRHOIDS: Status: ACTIVE | Noted: 2024-05-21

## 2024-07-03 LAB
ANION GAP SERPL CALCULATED.3IONS-SCNC: 11 MMOL/L (ref 7–15)
BUN SERPL-MCNC: 13.7 MG/DL (ref 8–23)
CALCIUM SERPL-MCNC: 9.4 MG/DL (ref 8.8–10.2)
CHLORIDE SERPL-SCNC: 102 MMOL/L (ref 98–107)
CHOLEST SERPL-MCNC: 180 MG/DL
CREAT SERPL-MCNC: 0.76 MG/DL (ref 0.51–0.95)
DEPRECATED HCO3 PLAS-SCNC: 27 MMOL/L (ref 22–29)
EGFRCR SERPLBLD CKD-EPI 2021: 89 ML/MIN/1.73M2
FASTING STATUS PATIENT QL REPORTED: YES
FASTING STATUS PATIENT QL REPORTED: YES
GLUCOSE SERPL-MCNC: 103 MG/DL (ref 70–99)
HDLC SERPL-MCNC: 52 MG/DL
LDLC SERPL CALC-MCNC: 91 MG/DL
NONHDLC SERPL-MCNC: 128 MG/DL
POTASSIUM SERPL-SCNC: 4.3 MMOL/L (ref 3.4–5.3)
SODIUM SERPL-SCNC: 140 MMOL/L (ref 135–145)
TRIGL SERPL-MCNC: 183 MG/DL

## 2024-07-03 PROCEDURE — 80048 BASIC METABOLIC PNL TOTAL CA: CPT | Performed by: NURSE PRACTITIONER

## 2024-07-03 PROCEDURE — 36415 COLL VENOUS BLD VENIPUNCTURE: CPT | Performed by: NURSE PRACTITIONER

## 2024-07-03 PROCEDURE — G2211 COMPLEX E/M VISIT ADD ON: HCPCS | Performed by: NURSE PRACTITIONER

## 2024-07-03 PROCEDURE — 80061 LIPID PANEL: CPT | Performed by: NURSE PRACTITIONER

## 2024-07-03 PROCEDURE — 99214 OFFICE O/P EST MOD 30 MIN: CPT | Performed by: NURSE PRACTITIONER

## 2024-07-03 RX ORDER — LISINOPRIL/HYDROCHLOROTHIAZIDE 10-12.5 MG
1 TABLET ORAL DAILY
Qty: 90 TABLET | Refills: 3 | Status: SHIPPED | OUTPATIENT
Start: 2024-07-03 | End: 2024-10-02 | Stop reason: SINTOL

## 2024-07-03 ASSESSMENT — ENCOUNTER SYMPTOMS
CONSTITUTIONAL NEGATIVE: 1
CARDIOVASCULAR NEGATIVE: 1

## 2024-07-03 ASSESSMENT — PAIN SCALES - GENERAL: PAINLEVEL: NO PAIN (0)

## 2024-07-03 NOTE — ASSESSMENT & PLAN NOTE
Discussed diet and exercise as well as carbohydrate intake and lipid control through diet and exercise.   Wt Readings from Last 4 Encounters:   07/03/24 100.7 kg (222 lb)   05/02/24 102.4 kg (225 lb 12.8 oz)   04/04/24 103.2 kg (227 lb 9.6 oz)   03/03/24 101.6 kg (224 lb)     Encouragement for success in losing 5 pounds this year.

## 2024-07-03 NOTE — PATIENT INSTRUCTIONS
We will likely increase your rosuvastatin based on your labs from today.  Keep up the good work with diet and exercise.

## 2024-07-03 NOTE — PROGRESS NOTES
Assessment & Plan   Benign essential hypertension   Controlled.  Also discussed minimizing decongestant with zyrtec.  Pt will trial OTC flonase as well.  Refills for 1 year.  Repeat BMP today  - lisinopril-hydrochlorothiazide (ZESTORETIC) 10-12.5 MG tablet  Dispense: 90 tablet; Refill: 3  - Basic metabolic panel  (Ca, Cl, CO2, Creat, Gluc, K, Na, BUN)    Mixed hyperlipidemia   Tolerating statin.  Plan increase dosing on rosuvastatin as indicated.  - Lipid Profile (Chol, Trig, HDL, LDL calc)    Morbid obesity (H)   Working on diet and exercise.     Return in about 6 months (around 1/3/2025) for In Clinic Follow Up.  Patient Instructions   We will likely increase your rosuvastatin based on your labs from today.  Keep up the good work with diet and exercise.    The longitudinal plan of care for the diagnosis(es)/condition(s) as documented were addressed during this visit. Due to the added complexity in care, I will continue to support Ketty in the subsequent management and with ongoing continuity of care.  MARIA ESTHER Yancey CNP  [facility] [place]  ============================================  Subjective  Mixed hyperlipidemia  Tolerating rosuvastatin 5 mg daily well.  Fasting labs today. Then anticipate taking rosuvastatin to 10 mg per day.    Benign essential hypertension  BP control adequate on lisinopril 10-hydrochlorathiazide 12.5 mg daily.  No side effects.     Morbid obesity (H)  Discussed diet and exercise as well as carbohydrate intake and lipid control through diet and exercise.   Wt Readings from Last 4 Encounters:   07/03/24 100.7 kg (222 lb)   05/02/24 102.4 kg (225 lb 12.8 oz)   04/04/24 103.2 kg (227 lb 9.6 oz)   03/03/24 101.6 kg (224 lb)     Encouragement for success in losing 5 pounds this year.    Component      Latest Ref Rng 4/4/2024  9:41 AM   Sodium      135 - 145 mmol/L 142    Potassium      3.4 - 5.3 mmol/L 3.9    Carbon Dioxide (CO2)      22 - 29 mmol/L 27    Anion Gap       7 - 15 mmol/L 12    Urea Nitrogen      8.0 - 23.0 mg/dL 12.0    Creatinine      0.51 - 0.95 mg/dL 0.65    GFR Estimate      >60 mL/min/1.73m2 >90    Calcium      8.8 - 10.2 mg/dL 9.4    Chloride      98 - 107 mmol/L 103    Glucose      70 - 99 mg/dL 100 (H)    Alkaline Phosphatase      40 - 150 U/L 119    AST      0 - 45 U/L 16    ALT      0 - 50 U/L 18    Protein Total      6.4 - 8.3 g/dL 7.2    Albumin      3.5 - 5.2 g/dL 4.4    Bilirubin Total      <=1.2 mg/dL 0.5    WBC      4.0 - 11.0 10e3/uL 5.9    RBC Count      3.80 - 5.20 10e6/uL 5.26 (H)    Hemoglobin      11.7 - 15.7 g/dL 15.6    Hematocrit      35.0 - 47.0 % 49.1 (H)    MCV      78 - 100 fL 93    MCH      26.5 - 33.0 pg 29.7    MCHC      31.5 - 36.5 g/dL 31.8    RDW      10.0 - 15.0 % 12.5    Platelet Count      150 - 450 10e3/uL 260    Cholesterol      <200 mg/dL 269 (H)    Triglycerides      <150 mg/dL 170 (H)    HDL Cholesterol      >=50 mg/dL 56    LDL Cholesterol Calculated      <=100 mg/dL 179 (H)    Non HDL Cholesterol      <130 mg/dL 213 (H)    Hemoglobin A1C      0.0 - 5.6 % 5.7 (H)    TSH      0.30 - 4.20 uIU/mL 0.90       Legend:  (H) High  History of Present Illness       Hyperlipidemia:  She presents for follow up of hyperlipidemia.   She is taking medication to lower cholesterol. She is not having myalgia or other side effects to statin medications.    Hypertension: She presents for follow up of hypertension.  She does not check blood pressure  regularly outside of the clinic. Outpatient blood pressures have not been over 140/90. She follows a low salt diet.     She eats 2-3 servings of fruits and vegetables daily.She consumes 0 sweetened beverage(s) daily.She exercises with enough effort to increase her heart rate 10 to 19 minutes per day.  She exercises with enough effort to increase her heart rate 3 or less days per week.   She is taking medications regularly.     Tobacco  Allergies  Meds  Problems  Med Hx  Surg Hx  Fam Hx        "  Review of Systems   Constitutional: Negative.    Cardiovascular: Negative.      ============================================  Objective    /88 (BP Location: Right arm, Patient Position: Sitting, Cuff Size: Adult Large)   Pulse 80   Temp 98.3  F (36.8  C) (Oral)   Resp 20   Ht 1.549 m (5' 1\")   Wt 100.7 kg (222 lb)   LMP  (LMP Unknown)   SpO2 95%   BMI 41.95 kg/m    Physical Exam  Constitutional:       Appearance: Normal appearance.   Cardiovascular:      Rate and Rhythm: Normal rate.   Pulmonary:      Effort: Pulmonary effort is normal.   Neurological:      Mental Status: She is alert.   Psychiatric:         Mood and Affect: Mood normal.        ============================================  MARIA ESTHER Yancey CNP  Signed Electronically by: MARIA ESTHER Yancey CNP          "

## 2024-08-21 DIAGNOSIS — E78.2 MIXED HYPERLIPIDEMIA: ICD-10-CM

## 2024-08-21 RX ORDER — ROSUVASTATIN CALCIUM 5 MG/1
5 TABLET, COATED ORAL DAILY
Qty: 90 TABLET | Refills: 1 | Status: SHIPPED | OUTPATIENT
Start: 2024-08-21

## 2024-08-30 ENCOUNTER — MYC MEDICAL ADVICE (OUTPATIENT)
Dept: FAMILY MEDICINE | Facility: CLINIC | Age: 64
End: 2024-08-30
Payer: COMMERCIAL

## 2024-09-10 ENCOUNTER — MYC MEDICAL ADVICE (OUTPATIENT)
Dept: FAMILY MEDICINE | Facility: CLINIC | Age: 64
End: 2024-09-10
Payer: COMMERCIAL

## 2024-09-10 NOTE — TELEPHONE ENCOUNTER
[facility]    Elaine Fraga RN on 9/10/2024 at 1:51 PM

## 2024-09-11 NOTE — TELEPHONE ENCOUNTER
Patient returns call.    S: Dry cough  B: began shortly after starting Lisinopril.  A: has been getting progressively worse. Is now keeping her awake at night and interrupting her work.    Denies: shortness of breath, chest pain, visual disturbances, upper respiratory infection symptoms.    R: No appointments available at [facility]. Patient declines other clinic options for now as she needs to check her schedule.    Will call back to schedule appointment.

## 2024-10-02 ENCOUNTER — OFFICE VISIT (OUTPATIENT)
Dept: FAMILY MEDICINE | Facility: CLINIC | Age: 64
End: 2024-10-02
Payer: COMMERCIAL

## 2024-10-02 VITALS
HEIGHT: 61 IN | SYSTOLIC BLOOD PRESSURE: 135 MMHG | RESPIRATION RATE: 18 BRPM | DIASTOLIC BLOOD PRESSURE: 82 MMHG | OXYGEN SATURATION: 95 % | TEMPERATURE: 98.1 F | HEART RATE: 98 BPM | WEIGHT: 229.1 LBS | BODY MASS INDEX: 43.25 KG/M2

## 2024-10-02 DIAGNOSIS — I10 BENIGN ESSENTIAL HYPERTENSION: Primary | ICD-10-CM

## 2024-10-02 PROCEDURE — G2211 COMPLEX E/M VISIT ADD ON: HCPCS | Performed by: NURSE PRACTITIONER

## 2024-10-02 PROCEDURE — 99213 OFFICE O/P EST LOW 20 MIN: CPT | Performed by: NURSE PRACTITIONER

## 2024-10-02 RX ORDER — LOSARTAN POTASSIUM AND HYDROCHLOROTHIAZIDE 12.5; 5 MG/1; MG/1
1 TABLET ORAL DAILY
Qty: 90 TABLET | Refills: 1 | Status: SHIPPED | OUTPATIENT
Start: 2024-10-02

## 2024-10-02 NOTE — PROGRESS NOTES
Assessment & Plan   Benign essential hypertension  ACE inhibitor cough.  Switch to ARB.  Use losartan-hydrochlorothiazide 50-12.5 mg daily.  If cough does not go away she can let us know and would do further workup.  However this appears very much like a straightforward ACE inhibitor cough presentation  - losartan-hydrochlorothiazide (HYZAAR) 50-12.5 MG tablet  Dispense: 90 tablet; Refill: 1    The longitudinal plan of care for the diagnosis(es)/condition(s) as documented were addressed during this visit. Due to the added complexity in care, I will continue to support Ketty in the subsequent management and with ongoing continuity of care.    No follow-ups on file.  There are no Patient Instructions on file for this visit.  MARIA ESTHER Yancey CNP  [facility] [place]  ============================================  Subjective  Benign essential hypertension  Currently having cough with the use of lisinopril-hydrochlorothiazide 10-12.5 mg daily.  Would like to switch to another medication if possible.  Discussed switching to losartan-hydrochlorothiazide 50-12.5 mg.  Discussed how this could be equivalent dosing to her current medication but should eliminate her cough.  She is not having any other chest pain dizziness lightheadedness headaches numbness tingling or visual changes.    History of Present Illness       Hypertension: She presents for follow up of hypertension.  She does not check blood pressure  regularly outside of the clinic. Outpatient blood pressures have not been over 140/90. She does not follow a low salt diet.     She eats 0-1 servings of fruits and vegetables daily.She consumes 1 sweetened beverage(s) daily.She exercises with enough effort to increase her heart rate 10 to 19 minutes per day.  She exercises with enough effort to increase her heart rate 3 or less days per week.   She is taking medications regularly.    Reviewed and updated as needed this visit by Provider   Jenna   "Allergies  Meds  Problems  Med Hx  Surg Hx  Fam Hx         Review of Systems  ============================================  Objective    /82 (BP Location: Right arm, Patient Position: Sitting, Cuff Size: Adult Large)   Pulse 98   Temp 98.1  F (36.7  C) (Oral)   Resp 18   Ht 1.549 m (5' 1\")   Wt 103.9 kg (229 lb 1.6 oz)   LMP  (LMP Unknown)   SpO2 95%   BMI 43.29 kg/m    Physical Exam  Constitutional:       Appearance: Normal appearance.   Eyes:      Conjunctiva/sclera: Conjunctivae normal.   Cardiovascular:      Rate and Rhythm: Normal rate and regular rhythm.      Heart sounds: Normal heart sounds.   Pulmonary:      Effort: Pulmonary effort is normal.      Breath sounds: Normal breath sounds.   Skin:     General: Skin is warm and dry.      Findings: No rash.   Neurological:      Mental Status: She is alert.   Psychiatric:         Mood and Affect: Mood normal.        ============================================  MARIA ESTHER Yancey CNP  Signed Electronically by: MARIA ESTHER Yancey CNP          "

## 2024-10-10 NOTE — ASSESSMENT & PLAN NOTE
Currently having cough with the use of lisinopril-hydrochlorothiazide 10-12.5 mg daily.  Would like to switch to another medication if possible.  Discussed switching to losartan-hydrochlorothiazide 50-12.5 mg.  Discussed how this could be equivalent dosing to her current medication but should eliminate her cough.  She is not having any other chest pain dizziness lightheadedness headaches numbness tingling or visual changes.

## 2025-01-09 ENCOUNTER — OFFICE VISIT (OUTPATIENT)
Dept: FAMILY MEDICINE | Facility: CLINIC | Age: 65
End: 2025-01-09
Attending: NURSE PRACTITIONER
Payer: COMMERCIAL

## 2025-01-09 VITALS
SYSTOLIC BLOOD PRESSURE: 155 MMHG | TEMPERATURE: 98.4 F | RESPIRATION RATE: 15 BRPM | OXYGEN SATURATION: 98 % | DIASTOLIC BLOOD PRESSURE: 88 MMHG | HEART RATE: 91 BPM | WEIGHT: 234.7 LBS | BODY MASS INDEX: 44.31 KG/M2 | HEIGHT: 61 IN

## 2025-01-09 DIAGNOSIS — I10 BENIGN ESSENTIAL HYPERTENSION: ICD-10-CM

## 2025-01-09 DIAGNOSIS — E78.2 MIXED HYPERLIPIDEMIA: Primary | ICD-10-CM

## 2025-01-09 DIAGNOSIS — G89.29 CHRONIC PAIN OF BOTH KNEES: ICD-10-CM

## 2025-01-09 DIAGNOSIS — E66.01 MORBID OBESITY (H): ICD-10-CM

## 2025-01-09 DIAGNOSIS — M25.562 CHRONIC PAIN OF BOTH KNEES: ICD-10-CM

## 2025-01-09 DIAGNOSIS — M25.561 CHRONIC PAIN OF BOTH KNEES: ICD-10-CM

## 2025-01-09 LAB
ALBUMIN SERPL BCG-MCNC: 4.2 G/DL (ref 3.5–5.2)
ALP SERPL-CCNC: 94 U/L (ref 40–150)
ALT SERPL W P-5'-P-CCNC: 19 U/L (ref 0–50)
ANION GAP SERPL CALCULATED.3IONS-SCNC: 10 MMOL/L (ref 7–15)
AST SERPL W P-5'-P-CCNC: 21 U/L (ref 0–45)
BILIRUB SERPL-MCNC: 0.4 MG/DL
BUN SERPL-MCNC: 16.9 MG/DL (ref 8–23)
CALCIUM SERPL-MCNC: 9.4 MG/DL (ref 8.8–10.4)
CHLORIDE SERPL-SCNC: 102 MMOL/L (ref 98–107)
CHOLEST SERPL-MCNC: 279 MG/DL
CREAT SERPL-MCNC: 0.72 MG/DL (ref 0.51–0.95)
EGFRCR SERPLBLD CKD-EPI 2021: >90 ML/MIN/1.73M2
EST. AVERAGE GLUCOSE BLD GHB EST-MCNC: 123 MG/DL
FASTING STATUS PATIENT QL REPORTED: YES
FASTING STATUS PATIENT QL REPORTED: YES
GLUCOSE SERPL-MCNC: 99 MG/DL (ref 70–99)
HBA1C MFR BLD: 5.9 % (ref 0–5.6)
HCO3 SERPL-SCNC: 28 MMOL/L (ref 22–29)
HDLC SERPL-MCNC: 48 MG/DL
LDLC SERPL CALC-MCNC: 188 MG/DL
NONHDLC SERPL-MCNC: 231 MG/DL
POTASSIUM SERPL-SCNC: 3.7 MMOL/L (ref 3.4–5.3)
PROT SERPL-MCNC: 7 G/DL (ref 6.4–8.3)
SODIUM SERPL-SCNC: 140 MMOL/L (ref 135–145)
TRIGL SERPL-MCNC: 214 MG/DL
TSH SERPL DL<=0.005 MIU/L-ACNC: 1.02 UIU/ML (ref 0.3–4.2)

## 2025-01-09 RX ORDER — LOSARTAN POTASSIUM AND HYDROCHLOROTHIAZIDE 12.5; 1 MG/1; MG/1
1 TABLET ORAL DAILY
Qty: 90 TABLET | Refills: 0 | Status: SHIPPED | OUTPATIENT
Start: 2025-01-09

## 2025-01-09 ASSESSMENT — ENCOUNTER SYMPTOMS
UNEXPECTED WEIGHT CHANGE: 0
GASTROINTESTINAL NEGATIVE: 1
PSYCHIATRIC NEGATIVE: 1
CARDIOVASCULAR NEGATIVE: 1
FATIGUE: 0
RESPIRATORY NEGATIVE: 1
APPETITE CHANGE: 0

## 2025-01-09 ASSESSMENT — PAIN SCALES - GENERAL: PAINLEVEL_OUTOF10: NO PAIN (0)

## 2025-01-09 NOTE — ASSESSMENT & PLAN NOTE
Poor control today.  Is taking losartan 50 mg/hydrochlorathiazide 12.5 mg daily.  Discussed med increase and patient is agreeable.  .  Denies chest pain, dyspnea, edema, or TIA's.

## 2025-01-09 NOTE — ASSESSMENT & PLAN NOTE
Component      Latest Ref Rng 7/3/2024  7:33 AM   Sodium      135 - 145 mmol/L 140    Potassium      3.4 - 5.3 mmol/L 4.3    Chloride      98 - 107 mmol/L 102    Carbon Dioxide (CO2)      22 - 29 mmol/L 27    Anion Gap      7 - 15 mmol/L 11    Urea Nitrogen      8.0 - 23.0 mg/dL 13.7    Creatinine      0.51 - 0.95 mg/dL 0.76    GFR Estimate      >60 mL/min/1.73m2 89    Calcium      8.8 - 10.2 mg/dL 9.4    Glucose      70 - 99 mg/dL 103 (H)    Patient Fasting? Yes    Patient Fasting? Yes    Cholesterol      <200 mg/dL 180    Triglycerides      <150 mg/dL 183 (H)    HDL Cholesterol      >=50 mg/dL 52    LDL Cholesterol Calculated      <=100 mg/dL 91    Non HDL Cholesterol      <130 mg/dL 128       Legend:  (H) High    Currently not taking rosuvastatin.  Feels like it contributed to myalgias, especially with knees.  Discussed usual side effects of statin and explained that it does not cause joint pain.  However osteoarthritis is also often associated with inflammation of the soft tissues around the joint.  May be difficult to differentiate medication side effects from arthritis related symptoms.  Patient not ready to try a different statin, and is working on dietary changes.  Would like repeat lipid today and follow up for discussion in 3-6 months.

## 2025-01-09 NOTE — ASSESSMENT & PLAN NOTE
She is working on diet and exercise by eating healthier but cannot seem to lose weight--and continues to gain weight.  Difficult to exercise due to osteoarthritis of knees.  Is going to a water aerobics class  Obesity contributes to these other medical conditions Hypertension with worsening control, and hyperlipidemia  Is currently not interested in weight loss medications.    Wt Readings from Last 4 Encounters:   01/09/25 106.5 kg (234 lb 11.2 oz)   10/02/24 103.9 kg (229 lb 1.6 oz)   07/03/24 100.7 kg (222 lb)   05/02/24 102.4 kg (225 lb 12.8 oz)

## 2025-01-09 NOTE — PROGRESS NOTES
Assessment & Plan   Mixed hyperlipidemia  Off rosuvastatin 5 mg daily due symptoms of joint pain.  Question whether this was medication related.   Recheck lipid,  and Follow-up as indicated   Pt to continue with dietary changes.  - Lipid Profile (Chol, Trig, HDL, LDL calc)  - Lipid Profile (Chol, Trig, HDL, LDL calc)    Morbid obesity (H)   Continues to gain weight despite diet and exercise.  No desire for medication management for obesity at this time.  - Comprehensive metabolic panel (BMP + Alb, Alk Phos, ALT, AST, Total. Bili, TP)  - Hemoglobin A1c  - TSH with free T4 reflex  - Comprehensive metabolic panel (BMP + Alb, Alk Phos, ALT, AST, Total. Bili, TP)  - Hemoglobin A1c  - TSH with free T4 reflex    Chronic pain of both knees   Refer for further evaluation.  Lack of activity contributing to weight, HTN and hyperlipidemia  - Orthopedic  Referral    Benign essential hypertension   Poor control  INcrease losartan and follow up for nurse recheck in 2-4 weeks.  - losartan-hydrochlorothiazide (HYZAAR) 100-12.5 MG tablet  Dispense: 90 tablet; Refill: 0  - Comprehensive metabolic panel (BMP + Alb, Alk Phos, ALT, AST, Total. Bili, TP)  - Comprehensive metabolic panel (BMP + Alb, Alk Phos, ALT, AST, Total. Bili, TP)      The longitudinal plan of care for the diagnosis(es)/condition(s) as documented were addressed during this visit. Due to the added complexity in care, I will continue to support Ketty in the subsequent management and with ongoing continuity of care.    Nurse only BP in 2-4 weeks  Recheck in 3 months.  There are no Patient Instructions on file for this visit.  MARIA ESTHER Yancey [facility] [place]  ============================================  Subjective  Mixed hyperlipidemia  Component      Latest Ref Rng 7/3/2024  7:33 AM   Sodium      135 - 145 mmol/L 140    Potassium      3.4 - 5.3 mmol/L 4.3    Chloride      98 - 107 mmol/L 102    Carbon Dioxide (CO2)      22 - 29  mmol/L 27    Anion Gap      7 - 15 mmol/L 11    Urea Nitrogen      8.0 - 23.0 mg/dL 13.7    Creatinine      0.51 - 0.95 mg/dL 0.76    GFR Estimate      >60 mL/min/1.73m2 89    Calcium      8.8 - 10.2 mg/dL 9.4    Glucose      70 - 99 mg/dL 103 (H)    Patient Fasting? Yes    Patient Fasting? Yes    Cholesterol      <200 mg/dL 180    Triglycerides      <150 mg/dL 183 (H)    HDL Cholesterol      >=50 mg/dL 52    LDL Cholesterol Calculated      <=100 mg/dL 91    Non HDL Cholesterol      <130 mg/dL 128       Legend:  (H) High    Currently not taking rosuvastatin.  Feels like it contributed to myalgias, especially with knees.  Discussed usual side effects of statin and explained that it does not cause joint pain.  However osteoarthritis is also often associated with inflammation of the soft tissues around the joint.  May be difficult to differentiate medication side effects from arthritis related symptoms.  Patient not ready to try a different statin, and is working on dietary changes.  Would like repeat lipid today and follow up for discussion in 3-6 months.    Benign essential hypertension  Poor control today.  Is taking losartan 50 mg/hydrochlorathiazide 12.5 mg daily.  Discussed med increase and patient is agreeable.  .  Denies chest pain, dyspnea, edema, or TIA's.      Morbid obesity (H)  She is working on diet and exercise by eating healthier but cannot seem to lose weight--and continues to gain weight.  Difficult to exercise due to osteoarthritis of knees.  Is going to a water aerobics class  Obesity contributes to these other medical conditions Hypertension with worsening control, and hyperlipidemia  Is currently not interested in weight loss medications.    Wt Readings from Last 4 Encounters:   01/09/25 106.5 kg (234 lb 11.2 oz)   10/02/24 103.9 kg (229 lb 1.6 oz)   07/03/24 100.7 kg (222 lb)   05/02/24 102.4 kg (225 lb 12.8 oz)        History of Present Illness       Hypertension: She presents for follow up of  "hypertension.  She does not check blood pressure  regularly outside of the clinic. Outpatient blood pressures have not been over 140/90. She does not follow a low salt diet.     She eats 2-3 servings of fruits and vegetables daily.She consumes 0 sweetened beverage(s) daily.She exercises with enough effort to increase her heart rate 9 or less minutes per day.  She exercises with enough effort to increase her heart rate 3 or less days per week.   She is taking medications regularly.    Reviewed and updated as needed this visit by Provider   Tobacco  Allergies  Meds  Problems  Med Hx  Surg Hx  Fam Hx         Review of Systems   Constitutional:  Negative for appetite change, fatigue and unexpected weight change.   HENT: Negative.     Respiratory: Negative.     Cardiovascular: Negative.    Gastrointestinal: Negative.    Musculoskeletal:         Bilateral knee pain   Skin:  Negative for rash.   Psychiatric/Behavioral: Negative.       ============================================  Objective    BP (!) 155/88 (BP Location: Right arm, Patient Position: Sitting, Cuff Size: Adult Large)   Pulse 91   Temp 98.4  F (36.9  C) (Oral)   Resp 15   Ht 1.549 m (5' 1\")   Wt 106.5 kg (234 lb 11.2 oz)   LMP  (LMP Unknown)   SpO2 98%   BMI 44.35 kg/m    Physical Exam  Constitutional:       Appearance: Normal appearance.   Eyes:      Conjunctiva/sclera: Conjunctivae normal.   Cardiovascular:      Rate and Rhythm: Normal rate and regular rhythm.      Heart sounds: Normal heart sounds.   Pulmonary:      Effort: Pulmonary effort is normal.      Breath sounds: Normal breath sounds.   Skin:     General: Skin is warm and dry.      Findings: No rash.   Neurological:      Mental Status: She is alert.   Psychiatric:         Mood and Affect: Mood normal.        ============================================  MARIA ESTHER Yancey CNP  Signed Electronically by: MARIA ESTHER Yancey CNP          "

## 2025-01-13 ENCOUNTER — PATIENT OUTREACH (OUTPATIENT)
Dept: CARE COORDINATION | Facility: CLINIC | Age: 65
End: 2025-01-13
Payer: COMMERCIAL

## 2025-03-10 ENCOUNTER — PATIENT OUTREACH (OUTPATIENT)
Dept: CARE COORDINATION | Facility: CLINIC | Age: 65
End: 2025-03-10
Payer: COMMERCIAL

## 2025-03-17 ENCOUNTER — VIRTUAL VISIT (OUTPATIENT)
Dept: FAMILY MEDICINE | Facility: CLINIC | Age: 65
End: 2025-03-17
Payer: COMMERCIAL

## 2025-03-17 DIAGNOSIS — J01.10 ACUTE NON-RECURRENT FRONTAL SINUSITIS: Primary | ICD-10-CM

## 2025-03-17 PROCEDURE — 98005 SYNCH AUDIO-VIDEO EST LOW 20: CPT | Performed by: FAMILY MEDICINE

## 2025-03-17 ASSESSMENT — ENCOUNTER SYMPTOMS: COUGH: 1

## 2025-03-17 NOTE — PROGRESS NOTES
Ketty is a 64 year old who is being evaluated via a billable video visit.    How would you like to obtain your AVS? MyChart  If the video visit is dropped, the invitation should be resent by: Text to cell phone: [phone]  Will anyone else be joining your video visit? No      Assessment and Plan    (J01.10) Acute non-recurrent frontal sinusitis  (primary encounter diagnosis)  Comment: borderline for sx duration, but a week+, so I think abx are appropriate.  Plan: amoxicillin-clavulanate (AUGMENTIN) 875-125 MG         tablet              RTC in 1w PRN    Manas Garber MD     Subjective   Ketty is a 64 year old, presenting for the following health issues:  Cough      3/17/2025     1:39 PM   Additional Questions   Roomed by Dawna SALINAS     Cough    History of Present Illness       Reason for visit:  Cough  Symptom onset:  1-2 weeks ago  Symptoms include:  Cough with green phlemg, fatigue  Symptom intensity:  Mild  Symptom progression:  Staying the same  Had these symptoms before:  Yes  Has tried/received treatment for these symptoms:  Yes  Previous treatment was successful:  No  What makes it worse:  When getting up in the morning  What makes it better:  N/a   She is taking medications regularly.        Feeling poorly starting 10 days ago, feelling more notably sick for a week.  Wet cough, fatigue, congestion.  No HA, sore throat, wheezing of shortness of breath.  Not really too bad overnight, but when she first gets up in the morning will cough a lot.  Feel that her as have been pretty steady of the last 2-3 days..  Did not take a home COVID test. Has been using Zyrtec D, no other medication.            Objective           Vitals:  No vitals were obtained today due to virtual visit.    Physical Exam   GENERAL: alert and no distress  EYES: Eyes grossly normal to inspection.  No discharge or erythema, or obvious scleral/conjunctival abnormalities.  RESP: No audible wheeze, cough, or visible cyanosis.    SKIN:  Visible skin clear. No significant rash, abnormal pigmentation or lesions.  NEURO: Cranial nerves grossly intact.  Mentation and speech appropriate for age.  PSYCH: Appropriate affect, tone, and pace of words          Video-Visit Details    Type of service:  Video Visit   Originating Location (pt. Location): Home    Distant Location (provider location):  Off-site  Platform used for Video Visit: Marcie  Signed Electronically by: Manas Garber MD

## 2025-04-02 ENCOUNTER — OFFICE VISIT (OUTPATIENT)
Dept: FAMILY MEDICINE | Facility: CLINIC | Age: 65
End: 2025-04-02
Payer: COMMERCIAL

## 2025-04-02 ENCOUNTER — TELEPHONE (OUTPATIENT)
Dept: FAMILY MEDICINE | Facility: CLINIC | Age: 65
End: 2025-04-02

## 2025-04-02 VITALS
BODY MASS INDEX: 45.03 KG/M2 | OXYGEN SATURATION: 96 % | HEART RATE: 81 BPM | DIASTOLIC BLOOD PRESSURE: 84 MMHG | WEIGHT: 238.5 LBS | TEMPERATURE: 98.5 F | HEIGHT: 61 IN | SYSTOLIC BLOOD PRESSURE: 168 MMHG | RESPIRATION RATE: 16 BRPM

## 2025-04-02 DIAGNOSIS — I10 BENIGN ESSENTIAL HYPERTENSION: Primary | ICD-10-CM

## 2025-04-02 DIAGNOSIS — E66.01 MORBID OBESITY (H): ICD-10-CM

## 2025-04-02 DIAGNOSIS — G47.33 OBSTRUCTIVE SLEEP APNEA SYNDROME: ICD-10-CM

## 2025-04-02 DIAGNOSIS — E78.2 MIXED HYPERLIPIDEMIA: ICD-10-CM

## 2025-04-02 PROBLEM — J01.01 ACUTE RECURRENT MAXILLARY SINUSITIS: Status: RESOLVED | Noted: 2024-03-03 | Resolved: 2025-04-02

## 2025-04-02 PROCEDURE — 99214 OFFICE O/P EST MOD 30 MIN: CPT | Performed by: NURSE PRACTITIONER

## 2025-04-02 PROCEDURE — 3079F DIAST BP 80-89 MM HG: CPT | Performed by: NURSE PRACTITIONER

## 2025-04-02 PROCEDURE — 1126F AMNT PAIN NOTED NONE PRSNT: CPT | Performed by: NURSE PRACTITIONER

## 2025-04-02 PROCEDURE — G2211 COMPLEX E/M VISIT ADD ON: HCPCS | Performed by: NURSE PRACTITIONER

## 2025-04-02 PROCEDURE — 3077F SYST BP >= 140 MM HG: CPT | Performed by: NURSE PRACTITIONER

## 2025-04-02 RX ORDER — AMLODIPINE BESYLATE 2.5 MG/1
2.5 TABLET ORAL DAILY
Qty: 90 TABLET | Refills: 0 | Status: SHIPPED | OUTPATIENT
Start: 2025-04-02

## 2025-04-02 ASSESSMENT — ENCOUNTER SYMPTOMS
RESPIRATORY NEGATIVE: 1
CARDIOVASCULAR NEGATIVE: 1
CONSTITUTIONAL NEGATIVE: 1

## 2025-04-02 ASSESSMENT — PAIN SCALES - GENERAL: PAINLEVEL_OUTOF10: NO PAIN (0)

## 2025-04-02 NOTE — ASSESSMENT & PLAN NOTE
Had myalgias with rosuvastatin.  Is not taking the atorvastatin at this time due to concerns with myalgias.  Does not wish to try any statins due to her concerns with side effects.     Lab Results   Component Value Date    CHOL 279 01/09/2025    CHOL 260 11/10/2020     Lab Results   Component Value Date    HDL 48 01/09/2025    HDL 44 11/10/2020     Lab Results   Component Value Date     01/09/2025     11/10/2020     Lab Results   Component Value Date    TRIG 214 01/09/2025    TRIG 217 11/10/2020     Declines the use of statins after discussion of risks and benefits.

## 2025-04-02 NOTE — ASSESSMENT & PLAN NOTE
Is on CPAP-- still having nighttime awakening several times per week.  Is continuing to gain weight.  Is interested in using zepbound for management of sleep apnea.    Is interested in the use of GLP-1 agents for weight loss.  Has tried diet and exercise without success, and continues to gain weight.  Risks and benefits of the medications were reviewed including common side effects such as GERD, nausea, vomiting, constipation, and palpitations.  The need for chronic management of weight was reviewed.  Patient has no contraindications to these medications such as multiple endocrine neoplasia history in self or family no thyroid cancer history, no history of pancreatitis.

## 2025-04-02 NOTE — PATIENT INSTRUCTIONS
Please start the amlodipine daily.  Continue the losartan/hydrochlorathiazide     Please see if the insurance covers the tirzepatide.  Start the 2.5 mg dose of tirzepatide.  Watch for constipation and heartburn.  Use over the counter miralax for constipation.  OK to also use fiber.  Please come back for recheck in a month.    Please schedule your one month follow up at the .

## 2025-04-02 NOTE — PROGRESS NOTES
Assessment & Plan   Benign essential hypertension   Poor control  Add amlodipine and follow up in one month.  If pt can start zepbound as well, weight loss will also help BP control so started amlodipine low dose.  - amLODIPine (NORVASC) 2.5 MG tablet  Dispense: 90 tablet; Refill: 0    Obstructive sleep apnea syndrome   On CPAP-- still having some awakening and worsening of symptoms.  Trial of zepbound.  - tirzepatide-Weight Management (ZEPBOUND) 2.5 MG/0.5ML prefilled pen  Dispense: 2 mL; Refill: 0    Morbid obesity (H)   Continues to gain weight despite making dietary changes.    Mixed hyperlipidemia   Declines statin use.      The longitudinal plan of care for the diagnosis(es)/condition(s) as documented were addressed during this visit. Due to the added complexity in care, I will continue to support Ketty in the subsequent management and with ongoing continuity of care.      Return in about 1 month (around 5/2/2025) for In Clinic Follow Up.  Patient Instructions   Please start the amlodipine daily.  Continue the losartan/hydrochlorathiazide     Please see if the insurance covers the tirzepatide.  Start the 2.5 mg dose of tirzepatide.  Watch for constipation and heartburn.  Use over the counter miralax for constipation.  OK to also use fiber.  Please come back for recheck in a month.    Please schedule your one month follow up at the .  MARIA ESTHER Yancey CNP  M [facility] [place]  ============================================  Subjective  Mixed hyperlipidemia  Had myalgias with rosuvastatin.  Is not taking the atorvastatin at this time due to concerns with myalgias.  Does not wish to try any statins due to her concerns with side effects.     Lab Results   Component Value Date    CHOL 279 01/09/2025    CHOL 260 11/10/2020     Lab Results   Component Value Date    HDL 48 01/09/2025    HDL 44 11/10/2020     Lab Results   Component Value Date     01/09/2025     11/10/2020      Lab Results   Component Value Date    TRIG 214 01/09/2025    TRIG 217 11/10/2020     Declines the use of statins after discussion of risks and benefits.      Benign essential hypertension  On losartan/hydrochlorathiazide 100-12.5 daily.  BP is elevated.  The patient is taking hypertensive medications compliantly without side effects.  Denies chest pain, dyspnea, edema, or TIA's.   Recheck is also elevated.  Plan to add amlodipine and see pt back in a month.      Morbid obesity (H)  Wt Readings from Last 4 Encounters:   01/09/25 106.5 kg (234 lb 11.2 oz)   10/02/24 103.9 kg (229 lb 1.6 oz)   07/03/24 100.7 kg (222 lb)   05/02/24 102.4 kg (225 lb 12.8 oz)           Obstructive sleep apnea syndrome  Is on CPAP-- still having nighttime awakening several times per week.  Is continuing to gain weight.  Is interested in using zepbound for management of sleep apnea.    Is interested in the use of GLP-1 agents for weight loss.  Has tried diet and exercise without success, and continues to gain weight.  Risks and benefits of the medications were reviewed including common side effects such as GERD, nausea, vomiting, constipation, and palpitations.  The need for chronic management of weight was reviewed.  Patient has no contraindications to these medications such as multiple endocrine neoplasia history in self or family no thyroid cancer history, no history of pancreatitis.      History of Present Illness       Hypertension: She presents for follow up of hypertension.  She does not check blood pressure  regularly outside of the clinic. Outpatient blood pressures have not been over 140/90. She does not follow a low salt diet.     She eats 2-3 servings of fruits and vegetables daily.She consumes 0 sweetened beverage(s) daily.She exercises with enough effort to increase her heart rate 10 to 19 minutes per day.  She exercises with enough effort to increase her heart rate 3 or less days per week.   She is taking medications  "regularly.    Reviewed and updated as needed this visit by Provider                 Review of Systems   Constitutional: Negative.    Respiratory: Negative.     Cardiovascular: Negative.  Negative for peripheral edema.     ============================================  Objective    BP (!) 168/84 (BP Location: Right arm, Patient Position: Sitting, Cuff Size: Adult Large)   Pulse 81   Temp 98.5  F (36.9  C) (Oral)   Resp 16   Ht 1.549 m (5' 1\")   Wt 108.2 kg (238 lb 8 oz)   LMP  (LMP Unknown)   SpO2 96%   BMI 45.06 kg/m    Physical Exam  Constitutional:       Appearance: Normal appearance.   Cardiovascular:      Rate and Rhythm: Normal rate.   Pulmonary:      Effort: Pulmonary effort is normal.   Neurological:      Mental Status: She is alert.   Psychiatric:         Mood and Affect: Mood normal.        ============================================  MARIA ESTHER Yancey CNP  Signed Electronically by: MARIA ESTHER Yancey CNP          "

## 2025-04-02 NOTE — TELEPHONE ENCOUNTER
Called and left a message for the pt to call us back.      I am not seeing a sleep study in records, but says incomplete results - archived data.  When the pt calls back, please see if she has had a sleep study per below and if so, we would need records.

## 2025-04-02 NOTE — ASSESSMENT & PLAN NOTE
Wt Readings from Last 4 Encounters:   01/09/25 106.5 kg (234 lb 11.2 oz)   10/02/24 103.9 kg (229 lb 1.6 oz)   07/03/24 100.7 kg (222 lb)   05/02/24 102.4 kg (225 lb 12.8 oz)

## 2025-04-02 NOTE — ASSESSMENT & PLAN NOTE
On losartan/hydrochlorathiazide 100-12.5 daily.  BP is elevated.  The patient is taking hypertensive medications compliantly without side effects.  Denies chest pain, dyspnea, edema, or TIA's.   Recheck is also elevated.  Plan to add amlodipine and see pt back in a month.

## 2025-04-02 NOTE — TELEPHONE ENCOUNTER
Below is from Aminata from the prior auth team.      Hello - in order to process a request when used for BRIELLE, we must provide documentation of a sleep test with additional information regarding the dx.   If appropriate please obtain records then route encounter back to the PA team for processing.   Thank you,   Aminata

## 2025-04-02 NOTE — NURSING NOTE
"Chief Complaint   Patient presents with    Hypertension     Initial BP (!) 168/84 (BP Location: Right arm, Patient Position: Sitting, Cuff Size: Adult Large)   Pulse 81   Temp 98.5  F (36.9  C) (Oral)   Resp 16   Ht 1.549 m (5' 1\")   Wt 108.2 kg (238 lb 8 oz)   LMP  (LMP Unknown)   SpO2 96%   BMI 45.06 kg/m   Estimated body mass index is 45.06 kg/m  as calculated from the following:    Height as of this encounter: 1.549 m (5' 1\").    Weight as of this encounter: 108.2 kg (238 lb 8 oz).  BP completed using cuff size large right arm    Lisa Magill, CMA    "

## 2025-04-04 NOTE — TELEPHONE ENCOUNTER
EPA INITIATED  PA Initiation    Medication: TIRZEPATIDE-WEIGHT MANAGEMENT 2.5 MG/0.5ML SC SOAJ  Insurance Company: [employer] [place] - Phone [phone] Fax [phone]  Pharmacy Filling the Rx: [id number] - [place], [place] - [address] [place] [address]  Filling Pharmacy Phone: [phone]  Filling Pharmacy Fax:    Start Date: 4/3/2025

## 2025-04-04 NOTE — TELEPHONE ENCOUNTER
PRIOR AUTHORIZATION DENIED    Medication: TIRZEPATIDE-WEIGHT MANAGEMENT 2.5 MG/0.5ML SC SOAJ  Insurance Company: [employer] [place] - Phone [phone] Fax [phone]  Denial Date: 4/4/2025  Denial Reason(s):     Appeal Information:     Patient Notified: No

## 2025-04-07 ENCOUNTER — TELEPHONE (OUTPATIENT)
Dept: FAMILY MEDICINE | Facility: CLINIC | Age: 65
End: 2025-04-07
Payer: COMMERCIAL

## 2025-04-07 NOTE — TELEPHONE ENCOUNTER
Patient Quality Outreach    Patient is due for the following:   Hypertension -  BP check      Topic Date Due    Pneumococcal Vaccine (1 of 1 - PCV) Never done       Action(s) Taken:   Schedule a nurse only visit for BP    Type of outreach:    Sent [email or web address] message.    Questions for provider review:    None         Alicia Lucio MA  Chart routed to .

## 2025-04-07 NOTE — TELEPHONE ENCOUNTER
Called patient, left voicemail, and asked patient to call back. Attempt # 1.     Edith Agee RN 4/7/2025  [facility]

## 2025-04-07 NOTE — TELEPHONE ENCOUNTER
Notified Patient  of denied prior authorization for Zepbound.     Person was given an opportunity to ask questions, verbalized understanding of plan, and is agreeable.     Aminata Torres RN

## 2025-04-09 ENCOUNTER — MYC REFILL (OUTPATIENT)
Dept: FAMILY MEDICINE | Facility: CLINIC | Age: 65
End: 2025-04-09
Payer: COMMERCIAL

## 2025-04-09 DIAGNOSIS — I10 BENIGN ESSENTIAL HYPERTENSION: ICD-10-CM

## 2025-04-09 RX ORDER — LOSARTAN POTASSIUM AND HYDROCHLOROTHIAZIDE 12.5; 1 MG/1; MG/1
1 TABLET ORAL DAILY
Qty: 90 TABLET | Refills: 0 | OUTPATIENT
Start: 2025-04-09

## 2025-04-10 ENCOUNTER — MYC REFILL (OUTPATIENT)
Dept: FAMILY MEDICINE | Facility: CLINIC | Age: 65
End: 2025-04-10
Payer: COMMERCIAL

## 2025-04-10 DIAGNOSIS — I10 BENIGN ESSENTIAL HYPERTENSION: ICD-10-CM

## 2025-04-10 RX ORDER — LOSARTAN POTASSIUM AND HYDROCHLOROTHIAZIDE 12.5; 1 MG/1; MG/1
1 TABLET ORAL DAILY
Qty: 90 TABLET | Refills: 0 | Status: CANCELLED | OUTPATIENT
Start: 2025-04-10

## 2025-04-10 NOTE — TELEPHONE ENCOUNTER
Pt called requesting refill of Losartan-Hydrochlorothiazide.  Advised pt this prescription was sent to [facility] on 4/9/25 and to call pharmacy to request fill.  Pt verbalized understanding and agreeable to plan.    Zenaida Vann RN, BSN  [facility]

## 2025-04-14 NOTE — TELEPHONE ENCOUNTER
Patient Quality Outreach    Patient is due for the following:   Hypertension -  BP check      Topic Date Due    Pneumococcal Vaccine (1 of 1 - PCV) Never done       Action(s) Taken:   Patient has upcoming appointment, these items will be addressed at that time.    Type of outreach:    Chart review performed, no outreach needed.    Questions for provider review:    None         Alicia Lucio MA  Chart routed to .

## 2025-05-01 NOTE — ASSESSMENT & PLAN NOTE
Tolerating rosuvastatin 5 mg daily well.  Fasting labs today. Then anticipate taking rosuvastatin to 10 mg per day.   My signature below certifies that the above stated patient is homebound and upon completion of the Face-To-Face encounter, has the need for intermittent skilled nursing, physical therapy and/or speech or occupational therapy services in their home for their current diagnosis as outlined in their initial plan of care. These services will continue to be monitored by myself or another physician.

## 2025-05-07 ENCOUNTER — OFFICE VISIT (OUTPATIENT)
Dept: FAMILY MEDICINE | Facility: CLINIC | Age: 65
End: 2025-05-07
Payer: COMMERCIAL

## 2025-05-07 VITALS
DIASTOLIC BLOOD PRESSURE: 76 MMHG | OXYGEN SATURATION: 98 % | SYSTOLIC BLOOD PRESSURE: 122 MMHG | WEIGHT: 235 LBS | BODY MASS INDEX: 44.37 KG/M2 | TEMPERATURE: 98.1 F | HEIGHT: 61 IN | HEART RATE: 83 BPM | RESPIRATION RATE: 18 BRPM

## 2025-05-07 DIAGNOSIS — I10 BENIGN ESSENTIAL HYPERTENSION: Primary | ICD-10-CM

## 2025-05-07 PROCEDURE — 3078F DIAST BP <80 MM HG: CPT | Performed by: NURSE PRACTITIONER

## 2025-05-07 PROCEDURE — 3074F SYST BP LT 130 MM HG: CPT | Performed by: NURSE PRACTITIONER

## 2025-05-07 PROCEDURE — 99213 OFFICE O/P EST LOW 20 MIN: CPT | Performed by: NURSE PRACTITIONER

## 2025-05-07 PROCEDURE — 1126F AMNT PAIN NOTED NONE PRSNT: CPT | Performed by: NURSE PRACTITIONER

## 2025-05-07 ASSESSMENT — ENCOUNTER SYMPTOMS
CONSTIPATION: 0
CHILLS: 0
FEVER: 0
PALPITATIONS: 0
CHEST TIGHTNESS: 0
ABDOMINAL PAIN: 0
DIARRHEA: 0
DYSURIA: 0
SHORTNESS OF BREATH: 0
UNEXPECTED WEIGHT CHANGE: 0

## 2025-05-07 ASSESSMENT — PAIN SCALES - GENERAL: PAINLEVEL_OUTOF10: NO PAIN (0)

## 2025-05-07 NOTE — ASSESSMENT & PLAN NOTE
BP well controlled on amlodipine 2.5 mg daily and losartan-hydrochlorathiazide 100-12.5 mg daily.  Has had increased ankle edema.  Is not happy with this side effect.  BP down, and pt has lost about 9 pounds.  Will try stopping amlodipine and monitoring BP closely.

## 2025-05-07 NOTE — PATIENT INSTRUCTIONS
Please check home BP's several times per week and report them in the next 2 weeks.  If over 150/90, let us know sooner.

## 2025-05-07 NOTE — PROGRESS NOTES
Assessment & Plan   Benign essential hypertension   Stop amlodipine and monitor home BP's carefully  Notify us of BPs from home and will change meds again if needed  Continue diet and exercise changes--continue noom        I spent a total of 20 minutes on the day of the visit.   Time spent by me today doing chart review, history and exam, documentation and further activities per the note    Patient Instructions   Please check home BP's several times per week and report them in the next 2 weeks.  If over 150/90, let us know sooner.  MARIA ESTHER Yancey CNP  M [facility] [place]  ============================================  Subjective  Benign essential hypertension  BP well controlled on amlodipine 2.5 mg daily and losartan-hydrochlorathiazide 100-12.5 mg daily.  Has had increased ankle edema.  Is not happy with this side effect.  BP down, and pt has lost about 9 pounds.  Will try stopping amlodipine and monitoring BP closely.    History of Present Illness       Hypertension: She presents for follow up of hypertension.  She does not check blood pressure  regularly outside of the clinic. Outpatient blood pressures have not been over 140/90. She does not follow a low salt diet.     She eats 2-3 servings of fruits and vegetables daily.She consumes 0 sweetened beverage(s) daily.She exercises with enough effort to increase her heart rate 20 to 29 minutes per day.  She exercises with enough effort to increase her heart rate 3 or less days per week.   She is taking medications regularly.    Reviewed by Provider at this visit   Tobacco  Allergies  Meds  Problems  Med Hx  Surg Hx  Fam Hx         Review of Systems   Constitutional:  Negative for chills, fever and unexpected weight change.   HENT: Negative.     Respiratory:  Negative for chest tightness and shortness of breath.    Cardiovascular:  Negative for chest pain and palpitations.   Gastrointestinal:  Negative for abdominal pain, constipation and  "diarrhea.   Genitourinary:  Negative for dysuria.   Skin:  Negative for rash.     ============================================  Objective    /76 (BP Location: Right arm, Patient Position: Sitting, Cuff Size: Adult Large)   Pulse 83   Temp 98.1  F (36.7  C) (Oral)   Resp 18   Ht 1.549 m (5' 1\")   Wt 106.6 kg (235 lb)   LMP  (LMP Unknown)   SpO2 98%   BMI 44.40 kg/m    Physical Exam  Constitutional:       Appearance: Normal appearance.   Cardiovascular:      Rate and Rhythm: Normal rate.   Pulmonary:      Effort: Pulmonary effort is normal.   Musculoskeletal:      Right lower leg: Edema present.      Left lower leg: Edema present.      Comments: Ankle edema 1-2+ bilaterally   Neurological:      Mental Status: She is alert.   Psychiatric:         Mood and Affect: Mood normal.        ============================================  MARIA ESTHER Yancey CNP  Signed Electronically by: MARIA ESTHER Yancey CNP          "

## 2025-05-09 ENCOUNTER — ANCILLARY PROCEDURE (OUTPATIENT)
Dept: MAMMOGRAPHY | Facility: CLINIC | Age: 65
End: 2025-05-09
Attending: NURSE PRACTITIONER
Payer: COMMERCIAL

## 2025-05-09 DIAGNOSIS — Z12.31 VISIT FOR SCREENING MAMMOGRAM: ICD-10-CM

## 2025-05-09 PROCEDURE — 77063 BREAST TOMOSYNTHESIS BI: CPT | Mod: TC | Performed by: RADIOLOGY

## 2025-05-09 PROCEDURE — 77067 SCR MAMMO BI INCL CAD: CPT | Mod: TC | Performed by: RADIOLOGY

## 2025-05-21 ENCOUNTER — MYC MEDICAL ADVICE (OUTPATIENT)
Dept: FAMILY MEDICINE | Facility: CLINIC | Age: 65
End: 2025-05-21
Payer: COMMERCIAL

## 2025-05-23 ENCOUNTER — ANCILLARY PROCEDURE (OUTPATIENT)
Dept: MAMMOGRAPHY | Facility: CLINIC | Age: 65
End: 2025-05-23
Attending: NURSE PRACTITIONER
Payer: COMMERCIAL

## 2025-05-23 DIAGNOSIS — Z12.31 VISIT FOR SCREENING MAMMOGRAM: ICD-10-CM

## 2025-05-24 ENCOUNTER — HEALTH MAINTENANCE LETTER (OUTPATIENT)
Age: 65
End: 2025-05-24

## 2025-07-01 ENCOUNTER — MYC REFILL (OUTPATIENT)
Dept: FAMILY MEDICINE | Facility: CLINIC | Age: 65
End: 2025-07-01
Payer: COMMERCIAL

## 2025-07-01 DIAGNOSIS — I10 BENIGN ESSENTIAL HYPERTENSION: ICD-10-CM

## 2025-07-01 RX ORDER — LOSARTAN POTASSIUM AND HYDROCHLOROTHIAZIDE 12.5; 1 MG/1; MG/1
1 TABLET ORAL DAILY
Qty: 90 TABLET | Refills: 1 | Status: SHIPPED | OUTPATIENT
Start: 2025-07-01

## (undated) DEVICE — KIT ENDO TURNOVER/PROCEDURE W/CLEAN A SCOPE LINERS 103888

## (undated) RX ORDER — FENTANYL CITRATE 50 UG/ML
INJECTION, SOLUTION INTRAMUSCULAR; INTRAVENOUS
Status: DISPENSED
Start: 2017-11-28

## (undated) RX ORDER — ONDANSETRON 2 MG/ML
INJECTION INTRAMUSCULAR; INTRAVENOUS
Status: DISPENSED
Start: 2017-11-28